# Patient Record
Sex: FEMALE | Race: OTHER | HISPANIC OR LATINO | URBAN - METROPOLITAN AREA
[De-identification: names, ages, dates, MRNs, and addresses within clinical notes are randomized per-mention and may not be internally consistent; named-entity substitution may affect disease eponyms.]

---

## 2017-02-21 ENCOUNTER — OUTPATIENT (OUTPATIENT)
Dept: OUTPATIENT SERVICES | Facility: HOSPITAL | Age: 52
LOS: 1 days | End: 2017-02-21
Payer: SELF-PAY

## 2017-02-21 ENCOUNTER — FORM ENCOUNTER (OUTPATIENT)
Age: 52
End: 2017-02-21

## 2017-02-21 ENCOUNTER — APPOINTMENT (OUTPATIENT)
Dept: INTERNAL MEDICINE | Facility: CLINIC | Age: 52
End: 2017-02-21

## 2017-02-21 VITALS
SYSTOLIC BLOOD PRESSURE: 112 MMHG | WEIGHT: 236 LBS | BODY MASS INDEX: 47.67 KG/M2 | HEART RATE: 68 BPM | DIASTOLIC BLOOD PRESSURE: 78 MMHG

## 2017-02-21 DIAGNOSIS — E66.01 MORBID (SEVERE) OBESITY DUE TO EXCESS CALORIES: ICD-10-CM

## 2017-02-21 DIAGNOSIS — R73.09 OTHER ABNORMAL GLUCOSE: ICD-10-CM

## 2017-02-21 DIAGNOSIS — Z23 ENCOUNTER FOR IMMUNIZATION: ICD-10-CM

## 2017-02-21 DIAGNOSIS — R10.9 UNSPECIFIED ABDOMINAL PAIN: ICD-10-CM

## 2017-02-21 DIAGNOSIS — M79.673 PAIN IN UNSPECIFIED FOOT: ICD-10-CM

## 2017-02-21 DIAGNOSIS — I10 ESSENTIAL (PRIMARY) HYPERTENSION: ICD-10-CM

## 2017-02-21 DIAGNOSIS — G89.29 OTHER CHRONIC PAIN: ICD-10-CM

## 2017-02-21 PROCEDURE — G0463: CPT

## 2017-02-22 ENCOUNTER — LABORATORY RESULT (OUTPATIENT)
Age: 52
End: 2017-02-22

## 2017-02-22 ENCOUNTER — OUTPATIENT (OUTPATIENT)
Dept: OUTPATIENT SERVICES | Facility: HOSPITAL | Age: 52
LOS: 1 days | End: 2017-02-22
Payer: SELF-PAY

## 2017-02-22 ENCOUNTER — APPOINTMENT (OUTPATIENT)
Dept: MAMMOGRAPHY | Facility: IMAGING CENTER | Age: 52
End: 2017-02-22

## 2017-02-22 DIAGNOSIS — R10.9 UNSPECIFIED ABDOMINAL PAIN: ICD-10-CM

## 2017-02-22 DIAGNOSIS — Z00.00 ENCOUNTER FOR GENERAL ADULT MEDICAL EXAMINATION WITHOUT ABNORMAL FINDINGS: ICD-10-CM

## 2017-02-22 DIAGNOSIS — K30 FUNCTIONAL DYSPEPSIA: ICD-10-CM

## 2017-02-22 DIAGNOSIS — K21.9 GASTRO-ESOPHAGEAL REFLUX DISEASE WITHOUT ESOPHAGITIS: ICD-10-CM

## 2017-02-22 DIAGNOSIS — A04.8 OTHER SPECIFIED BACTERIAL INTESTINAL INFECTIONS: ICD-10-CM

## 2017-02-22 DIAGNOSIS — G89.29 OTHER CHRONIC PAIN: ICD-10-CM

## 2017-02-22 DIAGNOSIS — E66.01 MORBID (SEVERE) OBESITY DUE TO EXCESS CALORIES: ICD-10-CM

## 2017-02-22 DIAGNOSIS — M79.673 PAIN IN UNSPECIFIED FOOT: ICD-10-CM

## 2017-02-22 DIAGNOSIS — R73.09 OTHER ABNORMAL GLUCOSE: ICD-10-CM

## 2017-02-22 DIAGNOSIS — Z23 ENCOUNTER FOR IMMUNIZATION: ICD-10-CM

## 2017-02-22 DIAGNOSIS — Z00.8 ENCOUNTER FOR OTHER GENERAL EXAMINATION: ICD-10-CM

## 2017-02-22 PROCEDURE — 83036 HEMOGLOBIN GLYCOSYLATED A1C: CPT

## 2017-02-22 PROCEDURE — 77063 BREAST TOMOSYNTHESIS BI: CPT

## 2017-02-22 PROCEDURE — 77067 SCR MAMMO BI INCL CAD: CPT

## 2017-02-22 PROCEDURE — 80061 LIPID PANEL: CPT

## 2017-02-23 LAB
CHOLEST SERPL-MCNC: 194 MG/DL — SIGNIFICANT CHANGE UP (ref 10–199)
HBA1C BLD-MCNC: 6.5 % — HIGH (ref 4–5.6)
HDLC SERPL-MCNC: 56 MG/DL — SIGNIFICANT CHANGE UP (ref 40–125)
LIPID PNL WITH DIRECT LDL SERPL: 124 MG/DL — SIGNIFICANT CHANGE UP
TOTAL CHOLESTEROL/HDL RATIO MEASUREMENT: 3.5 RATIO — SIGNIFICANT CHANGE UP (ref 3.3–7.1)
TRIGL SERPL-MCNC: 69 MG/DL — SIGNIFICANT CHANGE UP (ref 10–149)

## 2017-02-28 ENCOUNTER — APPOINTMENT (OUTPATIENT)
Dept: ULTRASOUND IMAGING | Facility: HOSPITAL | Age: 52
End: 2017-02-28

## 2017-06-22 ENCOUNTER — APPOINTMENT (OUTPATIENT)
Dept: INTERNAL MEDICINE | Facility: CLINIC | Age: 52
End: 2017-06-22

## 2017-06-22 ENCOUNTER — RESULT CHARGE (OUTPATIENT)
Age: 52
End: 2017-06-22

## 2017-06-22 ENCOUNTER — OUTPATIENT (OUTPATIENT)
Dept: OUTPATIENT SERVICES | Facility: HOSPITAL | Age: 52
LOS: 1 days | End: 2017-06-22
Payer: SELF-PAY

## 2017-06-22 VITALS
SYSTOLIC BLOOD PRESSURE: 118 MMHG | HEART RATE: 68 BPM | BODY MASS INDEX: 47.87 KG/M2 | WEIGHT: 237 LBS | RESPIRATION RATE: 14 BRPM | DIASTOLIC BLOOD PRESSURE: 72 MMHG

## 2017-06-22 DIAGNOSIS — I10 ESSENTIAL (PRIMARY) HYPERTENSION: ICD-10-CM

## 2017-06-22 DIAGNOSIS — Z00.8 ENCOUNTER FOR OTHER GENERAL EXAMINATION: ICD-10-CM

## 2017-06-22 PROCEDURE — G0463: CPT

## 2017-06-22 PROCEDURE — 83036 HEMOGLOBIN GLYCOSYLATED A1C: CPT

## 2017-06-26 DIAGNOSIS — M25.549 PAIN IN JOINTS OF UNSPECIFIED HAND: ICD-10-CM

## 2017-06-26 DIAGNOSIS — A04.8 OTHER SPECIFIED BACTERIAL INTESTINAL INFECTIONS: ICD-10-CM

## 2017-06-26 DIAGNOSIS — R73.09 OTHER ABNORMAL GLUCOSE: ICD-10-CM

## 2017-06-26 DIAGNOSIS — E66.01 MORBID (SEVERE) OBESITY DUE TO EXCESS CALORIES: ICD-10-CM

## 2017-06-26 LAB — HBA1C MFR BLD HPLC: 6.4

## 2017-07-12 ENCOUNTER — OUTPATIENT (OUTPATIENT)
Dept: OUTPATIENT SERVICES | Facility: HOSPITAL | Age: 52
LOS: 1 days | End: 2017-07-12
Payer: SELF-PAY

## 2017-07-12 ENCOUNTER — NON-APPOINTMENT (OUTPATIENT)
Age: 52
End: 2017-07-12

## 2017-07-12 ENCOUNTER — APPOINTMENT (OUTPATIENT)
Dept: CARDIOLOGY | Facility: HOSPITAL | Age: 52
End: 2017-07-12

## 2017-07-12 VITALS — DIASTOLIC BLOOD PRESSURE: 87 MMHG | HEART RATE: 59 BPM | OXYGEN SATURATION: 98 % | SYSTOLIC BLOOD PRESSURE: 148 MMHG

## 2017-07-12 DIAGNOSIS — I25.10 ATHEROSCLEROTIC HEART DISEASE OF NATIVE CORONARY ARTERY WITHOUT ANGINA PECTORIS: ICD-10-CM

## 2017-07-12 PROCEDURE — G0463: CPT

## 2017-07-12 PROCEDURE — 93005 ELECTROCARDIOGRAM TRACING: CPT

## 2017-07-19 ENCOUNTER — APPOINTMENT (OUTPATIENT)
Dept: PODIATRY | Facility: HOSPITAL | Age: 52
End: 2017-07-19

## 2017-07-19 ENCOUNTER — OUTPATIENT (OUTPATIENT)
Dept: OUTPATIENT SERVICES | Facility: HOSPITAL | Age: 52
LOS: 1 days | End: 2017-07-19
Payer: SELF-PAY

## 2017-07-19 VITALS
HEIGHT: 59 IN | BODY MASS INDEX: 47.37 KG/M2 | RESPIRATION RATE: 14 BRPM | SYSTOLIC BLOOD PRESSURE: 113 MMHG | HEART RATE: 69 BPM | DIASTOLIC BLOOD PRESSURE: 78 MMHG | WEIGHT: 235 LBS

## 2017-07-19 DIAGNOSIS — M25.572 PAIN IN LEFT ANKLE AND JOINTS OF LEFT FOOT: ICD-10-CM

## 2017-07-19 DIAGNOSIS — Z00.00 ENCOUNTER FOR GENERAL ADULT MEDICAL EXAMINATION WITHOUT ABNORMAL FINDINGS: ICD-10-CM

## 2017-07-19 DIAGNOSIS — M79.673 PAIN IN UNSPECIFIED FOOT: ICD-10-CM

## 2017-07-19 DIAGNOSIS — M79.609 PAIN IN UNSPECIFIED LIMB: ICD-10-CM

## 2017-07-19 PROCEDURE — 73610 X-RAY EXAM OF ANKLE: CPT

## 2017-07-19 PROCEDURE — G0463: CPT

## 2017-07-19 PROCEDURE — 73610 X-RAY EXAM OF ANKLE: CPT | Mod: 26,50

## 2017-07-19 PROCEDURE — 73630 X-RAY EXAM OF FOOT: CPT

## 2017-07-19 PROCEDURE — 73630 X-RAY EXAM OF FOOT: CPT | Mod: 26,50

## 2017-07-20 DIAGNOSIS — I31.3 PERICARDIAL EFFUSION (NONINFLAMMATORY): ICD-10-CM

## 2017-07-24 ENCOUNTER — APPOINTMENT (OUTPATIENT)
Dept: INTERNAL MEDICINE | Facility: CLINIC | Age: 52
End: 2017-07-24

## 2017-07-24 ENCOUNTER — OUTPATIENT (OUTPATIENT)
Dept: OUTPATIENT SERVICES | Facility: HOSPITAL | Age: 52
LOS: 1 days | End: 2017-07-24
Payer: SELF-PAY

## 2017-07-24 ENCOUNTER — RESULT CHARGE (OUTPATIENT)
Age: 52
End: 2017-07-24

## 2017-07-24 VITALS
WEIGHT: 235 LBS | OXYGEN SATURATION: 95 % | HEART RATE: 98 BPM | TEMPERATURE: 99.2 F | HEIGHT: 59 IN | SYSTOLIC BLOOD PRESSURE: 120 MMHG | BODY MASS INDEX: 47.37 KG/M2 | DIASTOLIC BLOOD PRESSURE: 68 MMHG | RESPIRATION RATE: 14 BRPM

## 2017-07-24 DIAGNOSIS — E66.01 MORBID (SEVERE) OBESITY DUE TO EXCESS CALORIES: ICD-10-CM

## 2017-07-24 DIAGNOSIS — M25.549 PAIN IN JOINTS OF UNSPECIFIED HAND: ICD-10-CM

## 2017-07-24 DIAGNOSIS — I10 ESSENTIAL (PRIMARY) HYPERTENSION: ICD-10-CM

## 2017-07-24 DIAGNOSIS — A04.8 OTHER SPECIFIED BACTERIAL INTESTINAL INFECTIONS: ICD-10-CM

## 2017-07-24 LAB — RESULT: POSITIVE

## 2017-07-24 PROCEDURE — G0463: CPT

## 2017-07-24 RX ORDER — PENICILLIN G BENZATHINE 1200000 [IU]/2ML
1200000 INJECTION, SUSPENSION INTRAMUSCULAR
Qty: 1 | Refills: 0 | Status: COMPLETED | OUTPATIENT
Start: 2017-07-24

## 2017-07-24 RX ADMIN — Medication 0 UNIT/2ML: at 00:00

## 2017-07-25 ENCOUNTER — FORM ENCOUNTER (OUTPATIENT)
Age: 52
End: 2017-07-25

## 2017-07-25 DIAGNOSIS — J02.9 ACUTE PHARYNGITIS, UNSPECIFIED: ICD-10-CM

## 2017-07-26 ENCOUNTER — OUTPATIENT (OUTPATIENT)
Dept: OUTPATIENT SERVICES | Facility: HOSPITAL | Age: 52
LOS: 1 days | End: 2017-07-26
Payer: SELF-PAY

## 2017-07-26 ENCOUNTER — APPOINTMENT (OUTPATIENT)
Dept: ULTRASOUND IMAGING | Facility: HOSPITAL | Age: 52
End: 2017-07-26

## 2017-07-26 DIAGNOSIS — R73.09 OTHER ABNORMAL GLUCOSE: ICD-10-CM

## 2017-07-26 DIAGNOSIS — E66.01 MORBID (SEVERE) OBESITY DUE TO EXCESS CALORIES: ICD-10-CM

## 2017-07-26 DIAGNOSIS — A04.8 OTHER SPECIFIED BACTERIAL INTESTINAL INFECTIONS: ICD-10-CM

## 2017-07-26 DIAGNOSIS — M25.549 PAIN IN JOINTS OF UNSPECIFIED HAND: ICD-10-CM

## 2017-07-26 PROCEDURE — 93923 UPR/LXTR ART STDY 3+ LVLS: CPT

## 2017-07-26 PROCEDURE — 93923 UPR/LXTR ART STDY 3+ LVLS: CPT | Mod: 26

## 2017-07-31 ENCOUNTER — APPOINTMENT (OUTPATIENT)
Dept: CV DIAGNOSITCS | Facility: HOSPITAL | Age: 52
End: 2017-07-31

## 2017-07-31 ENCOUNTER — OUTPATIENT (OUTPATIENT)
Dept: OUTPATIENT SERVICES | Facility: HOSPITAL | Age: 52
LOS: 1 days | End: 2017-07-31
Payer: SELF-PAY

## 2017-07-31 DIAGNOSIS — I25.10 ATHEROSCLEROTIC HEART DISEASE OF NATIVE CORONARY ARTERY WITHOUT ANGINA PECTORIS: ICD-10-CM

## 2017-07-31 DIAGNOSIS — I31.3 PERICARDIAL EFFUSION (NONINFLAMMATORY): ICD-10-CM

## 2017-07-31 PROCEDURE — 93306 TTE W/DOPPLER COMPLETE: CPT | Mod: 26

## 2017-07-31 PROCEDURE — 93306 TTE W/DOPPLER COMPLETE: CPT

## 2017-09-06 ENCOUNTER — APPOINTMENT (OUTPATIENT)
Dept: PODIATRY | Facility: HOSPITAL | Age: 52
End: 2017-09-06

## 2018-02-26 ENCOUNTER — APPOINTMENT (OUTPATIENT)
Dept: INTERNAL MEDICINE | Facility: CLINIC | Age: 53
End: 2018-02-26

## 2018-03-09 ENCOUNTER — LABORATORY RESULT (OUTPATIENT)
Age: 53
End: 2018-03-09

## 2018-03-09 ENCOUNTER — APPOINTMENT (OUTPATIENT)
Dept: INTERNAL MEDICINE | Facility: CLINIC | Age: 53
End: 2018-03-09

## 2018-03-09 ENCOUNTER — OUTPATIENT (OUTPATIENT)
Dept: OUTPATIENT SERVICES | Facility: HOSPITAL | Age: 53
LOS: 1 days | End: 2018-03-09
Payer: SELF-PAY

## 2018-03-09 VITALS
HEART RATE: 67 BPM | OXYGEN SATURATION: 96 % | WEIGHT: 236 LBS | BODY MASS INDEX: 44.56 KG/M2 | HEIGHT: 61 IN | DIASTOLIC BLOOD PRESSURE: 62 MMHG | SYSTOLIC BLOOD PRESSURE: 112 MMHG

## 2018-03-09 DIAGNOSIS — Z86.69 PERSONAL HISTORY OF OTHER DISEASES OF THE NERVOUS SYSTEM AND SENSE ORGANS: ICD-10-CM

## 2018-03-09 DIAGNOSIS — I10 ESSENTIAL (PRIMARY) HYPERTENSION: ICD-10-CM

## 2018-03-09 DIAGNOSIS — M25.549 PAIN IN JOINTS OF UNSPECIFIED HAND: ICD-10-CM

## 2018-03-09 DIAGNOSIS — R10.9 UNSPECIFIED ABDOMINAL PAIN: ICD-10-CM

## 2018-03-09 DIAGNOSIS — R10.2 PELVIC AND PERINEAL PAIN: ICD-10-CM

## 2018-03-09 LAB
CHOLEST SERPL-MCNC: 179 MG/DL — SIGNIFICANT CHANGE UP (ref 10–199)
CREAT ?TM UR-MCNC: 134 MG/DL — SIGNIFICANT CHANGE UP
HBA1C BLD-MCNC: 6.4 % — HIGH (ref 4–5.6)
HDLC SERPL-MCNC: 55 MG/DL — SIGNIFICANT CHANGE UP (ref 40–125)
LIPID PNL WITH DIRECT LDL SERPL: 110 MG/DL — SIGNIFICANT CHANGE UP
MICROALBUMIN UR-MCNC: 6 MG/DL — SIGNIFICANT CHANGE UP
MICROALBUMIN/CREAT UR-RTO: 45 MG/G — HIGH (ref 0–30)
TOTAL CHOLESTEROL/HDL RATIO MEASUREMENT: 3.3 RATIO — SIGNIFICANT CHANGE UP (ref 3.3–7.1)
TRIGL SERPL-MCNC: 69 MG/DL — SIGNIFICANT CHANGE UP (ref 10–149)

## 2018-03-09 PROCEDURE — G0463: CPT

## 2018-03-09 PROCEDURE — G0008: CPT

## 2018-03-09 PROCEDURE — 83036 HEMOGLOBIN GLYCOSYLATED A1C: CPT

## 2018-03-09 PROCEDURE — 82043 UR ALBUMIN QUANTITATIVE: CPT

## 2018-03-09 PROCEDURE — 90688 IIV4 VACCINE SPLT 0.5 ML IM: CPT

## 2018-03-09 PROCEDURE — 80061 LIPID PANEL: CPT

## 2018-03-22 DIAGNOSIS — M25.549 PAIN IN JOINTS OF UNSPECIFIED HAND: ICD-10-CM

## 2018-03-22 DIAGNOSIS — R10.9 UNSPECIFIED ABDOMINAL PAIN: ICD-10-CM

## 2018-03-22 DIAGNOSIS — Z87.09 PERSONAL HISTORY OF OTHER DISEASES OF THE RESPIRATORY SYSTEM: ICD-10-CM

## 2018-03-22 DIAGNOSIS — Z86.19 PERSONAL HISTORY OF OTHER INFECTIOUS AND PARASITIC DISEASES: ICD-10-CM

## 2018-03-22 DIAGNOSIS — R10.2 PELVIC AND PERINEAL PAIN: ICD-10-CM

## 2018-03-22 DIAGNOSIS — Z23 ENCOUNTER FOR IMMUNIZATION: ICD-10-CM

## 2018-03-22 DIAGNOSIS — Z86.69 PERSONAL HISTORY OF OTHER DISEASES OF THE NERVOUS SYSTEM AND SENSE ORGANS: ICD-10-CM

## 2018-07-19 ENCOUNTER — FORM ENCOUNTER (OUTPATIENT)
Age: 53
End: 2018-07-19

## 2018-07-20 ENCOUNTER — OUTPATIENT (OUTPATIENT)
Dept: OUTPATIENT SERVICES | Facility: HOSPITAL | Age: 53
LOS: 1 days | End: 2018-07-20
Payer: SELF-PAY

## 2018-07-20 ENCOUNTER — APPOINTMENT (OUTPATIENT)
Dept: MAMMOGRAPHY | Facility: IMAGING CENTER | Age: 53
End: 2018-07-20
Payer: COMMERCIAL

## 2018-07-20 DIAGNOSIS — M25.549 PAIN IN JOINTS OF UNSPECIFIED HAND: ICD-10-CM

## 2018-07-20 DIAGNOSIS — Z86.69 PERSONAL HISTORY OF OTHER DISEASES OF THE NERVOUS SYSTEM AND SENSE ORGANS: ICD-10-CM

## 2018-07-20 DIAGNOSIS — Z86.19 PERSONAL HISTORY OF OTHER INFECTIOUS AND PARASITIC DISEASES: ICD-10-CM

## 2018-07-20 DIAGNOSIS — Z23 ENCOUNTER FOR IMMUNIZATION: ICD-10-CM

## 2018-07-20 DIAGNOSIS — Z87.09 PERSONAL HISTORY OF OTHER DISEASES OF THE RESPIRATORY SYSTEM: ICD-10-CM

## 2018-07-20 DIAGNOSIS — R10.9 UNSPECIFIED ABDOMINAL PAIN: ICD-10-CM

## 2018-07-20 DIAGNOSIS — R10.2 PELVIC AND PERINEAL PAIN: ICD-10-CM

## 2018-07-20 PROCEDURE — 77067 SCR MAMMO BI INCL CAD: CPT

## 2018-07-20 PROCEDURE — 77063 BREAST TOMOSYNTHESIS BI: CPT

## 2018-07-20 PROCEDURE — 77063 BREAST TOMOSYNTHESIS BI: CPT | Mod: 26

## 2018-07-20 PROCEDURE — 77067 SCR MAMMO BI INCL CAD: CPT | Mod: 26

## 2018-08-22 ENCOUNTER — APPOINTMENT (OUTPATIENT)
Dept: PODIATRY | Facility: HOSPITAL | Age: 53
End: 2018-08-22

## 2018-08-22 ENCOUNTER — OUTPATIENT (OUTPATIENT)
Dept: OUTPATIENT SERVICES | Facility: HOSPITAL | Age: 53
LOS: 1 days | End: 2018-08-22
Payer: SELF-PAY

## 2018-08-22 ENCOUNTER — APPOINTMENT (OUTPATIENT)
Dept: CARDIOLOGY | Facility: HOSPITAL | Age: 53
End: 2018-08-22

## 2018-08-22 ENCOUNTER — NON-APPOINTMENT (OUTPATIENT)
Age: 53
End: 2018-08-22

## 2018-08-22 VITALS
WEIGHT: 236 LBS | HEIGHT: 61 IN | HEART RATE: 58 BPM | SYSTOLIC BLOOD PRESSURE: 120 MMHG | BODY MASS INDEX: 44.56 KG/M2 | RESPIRATION RATE: 14 BRPM | DIASTOLIC BLOOD PRESSURE: 77 MMHG

## 2018-08-22 VITALS
WEIGHT: 236 LBS | HEART RATE: 61 BPM | HEIGHT: 61 IN | SYSTOLIC BLOOD PRESSURE: 135 MMHG | DIASTOLIC BLOOD PRESSURE: 85 MMHG | OXYGEN SATURATION: 96 % | BODY MASS INDEX: 44.56 KG/M2

## 2018-08-22 DIAGNOSIS — I25.10 ATHEROSCLEROTIC HEART DISEASE OF NATIVE CORONARY ARTERY WITHOUT ANGINA PECTORIS: ICD-10-CM

## 2018-08-22 DIAGNOSIS — M79.673 PAIN IN UNSPECIFIED FOOT: ICD-10-CM

## 2018-08-22 DIAGNOSIS — M79.609 PAIN IN UNSPECIFIED LIMB: ICD-10-CM

## 2018-08-22 PROCEDURE — 93005 ELECTROCARDIOGRAM TRACING: CPT

## 2018-08-22 PROCEDURE — G0463: CPT

## 2018-08-22 PROCEDURE — 73630 X-RAY EXAM OF FOOT: CPT

## 2018-08-22 PROCEDURE — 73630 X-RAY EXAM OF FOOT: CPT | Mod: 26,50

## 2018-08-23 DIAGNOSIS — I31.3 PERICARDIAL EFFUSION (NONINFLAMMATORY): ICD-10-CM

## 2018-09-12 ENCOUNTER — OUTPATIENT (OUTPATIENT)
Dept: OUTPATIENT SERVICES | Facility: HOSPITAL | Age: 53
LOS: 1 days | End: 2018-09-12
Payer: SELF-PAY

## 2018-09-12 ENCOUNTER — APPOINTMENT (OUTPATIENT)
Dept: OBGYN | Facility: CLINIC | Age: 53
End: 2018-09-12

## 2018-09-12 ENCOUNTER — APPOINTMENT (OUTPATIENT)
Dept: PODIATRY | Facility: HOSPITAL | Age: 53
End: 2018-09-12

## 2018-09-12 VITALS
BODY MASS INDEX: 44.56 KG/M2 | WEIGHT: 236 LBS | HEIGHT: 61 IN | HEART RATE: 58 BPM | SYSTOLIC BLOOD PRESSURE: 113 MMHG | DIASTOLIC BLOOD PRESSURE: 76 MMHG

## 2018-09-12 DIAGNOSIS — M79.643 PAIN IN UNSPECIFIED HAND: ICD-10-CM

## 2018-09-12 PROCEDURE — 20550 NJX 1 TENDON SHEATH/LIGAMENT: CPT

## 2018-09-13 DIAGNOSIS — E11.9 TYPE 2 DIABETES MELLITUS WITHOUT COMPLICATIONS: ICD-10-CM

## 2018-09-13 DIAGNOSIS — M72.2 PLANTAR FASCIAL FIBROMATOSIS: ICD-10-CM

## 2018-09-26 ENCOUNTER — APPOINTMENT (OUTPATIENT)
Dept: PODIATRY | Facility: HOSPITAL | Age: 53
End: 2018-09-26

## 2018-09-26 ENCOUNTER — OUTPATIENT (OUTPATIENT)
Dept: OUTPATIENT SERVICES | Facility: HOSPITAL | Age: 53
LOS: 1 days | End: 2018-09-26
Payer: SELF-PAY

## 2018-09-26 VITALS
SYSTOLIC BLOOD PRESSURE: 121 MMHG | BODY MASS INDEX: 44.56 KG/M2 | WEIGHT: 236 LBS | HEART RATE: 60 BPM | HEIGHT: 61 IN | DIASTOLIC BLOOD PRESSURE: 79 MMHG | RESPIRATION RATE: 16 BRPM

## 2018-09-26 DIAGNOSIS — M72.2 PLANTAR FASCIAL FIBROMATOSIS: ICD-10-CM

## 2018-09-26 DIAGNOSIS — M79.609 PAIN IN UNSPECIFIED LIMB: ICD-10-CM

## 2018-09-26 DIAGNOSIS — M79.673 PAIN IN UNSPECIFIED FOOT: ICD-10-CM

## 2018-09-26 PROCEDURE — G0463: CPT

## 2018-10-04 ENCOUNTER — LABORATORY RESULT (OUTPATIENT)
Age: 53
End: 2018-10-04

## 2018-10-04 ENCOUNTER — APPOINTMENT (OUTPATIENT)
Dept: OBGYN | Facility: CLINIC | Age: 53
End: 2018-10-04
Payer: COMMERCIAL

## 2018-10-04 ENCOUNTER — OUTPATIENT (OUTPATIENT)
Dept: OUTPATIENT SERVICES | Facility: HOSPITAL | Age: 53
LOS: 1 days | End: 2018-10-04
Payer: SELF-PAY

## 2018-10-04 VITALS
BODY MASS INDEX: 44.79 KG/M2 | DIASTOLIC BLOOD PRESSURE: 80 MMHG | SYSTOLIC BLOOD PRESSURE: 132 MMHG | WEIGHT: 237.25 LBS | HEIGHT: 61 IN

## 2018-10-04 DIAGNOSIS — N76.0 ACUTE VAGINITIS: ICD-10-CM

## 2018-10-04 DIAGNOSIS — M72.2 PLANTAR FASCIAL FIBROMATOSIS: ICD-10-CM

## 2018-10-04 DIAGNOSIS — M25.572 PAIN IN LEFT ANKLE AND JOINTS OF LEFT FOOT: ICD-10-CM

## 2018-10-04 PROCEDURE — 87624 HPV HI-RISK TYP POOLED RSLT: CPT

## 2018-10-04 PROCEDURE — 99214 OFFICE O/P EST MOD 30 MIN: CPT | Mod: NC

## 2018-10-04 PROCEDURE — 81003 URINALYSIS AUTO W/O SCOPE: CPT

## 2018-10-04 PROCEDURE — G0463: CPT

## 2018-10-04 PROCEDURE — 81003 URINALYSIS AUTO W/O SCOPE: CPT | Mod: NC,QW

## 2018-10-08 ENCOUNTER — FORM ENCOUNTER (OUTPATIENT)
Age: 53
End: 2018-10-08

## 2018-10-09 ENCOUNTER — APPOINTMENT (OUTPATIENT)
Dept: ULTRASOUND IMAGING | Facility: CLINIC | Age: 53
End: 2018-10-09
Payer: COMMERCIAL

## 2018-10-09 ENCOUNTER — OUTPATIENT (OUTPATIENT)
Dept: OUTPATIENT SERVICES | Facility: HOSPITAL | Age: 53
LOS: 1 days | End: 2018-10-09
Payer: SELF-PAY

## 2018-10-09 DIAGNOSIS — N95.0 POSTMENOPAUSAL BLEEDING: ICD-10-CM

## 2018-10-09 DIAGNOSIS — N76.0 ACUTE VAGINITIS: ICD-10-CM

## 2018-10-09 PROCEDURE — 76830 TRANSVAGINAL US NON-OB: CPT | Mod: 26

## 2018-10-09 PROCEDURE — 76856 US EXAM PELVIC COMPLETE: CPT

## 2018-10-09 PROCEDURE — 76830 TRANSVAGINAL US NON-OB: CPT

## 2018-10-09 PROCEDURE — 76856 US EXAM PELVIC COMPLETE: CPT | Mod: 26

## 2018-10-10 ENCOUNTER — OUTPATIENT (OUTPATIENT)
Dept: OUTPATIENT SERVICES | Facility: HOSPITAL | Age: 53
LOS: 1 days | End: 2018-10-10
Payer: SELF-PAY

## 2018-10-10 ENCOUNTER — APPOINTMENT (OUTPATIENT)
Dept: PODIATRY | Facility: HOSPITAL | Age: 53
End: 2018-10-10

## 2018-10-10 VITALS
HEART RATE: 59 BPM | SYSTOLIC BLOOD PRESSURE: 111 MMHG | HEIGHT: 61 IN | DIASTOLIC BLOOD PRESSURE: 77 MMHG | RESPIRATION RATE: 16 BRPM

## 2018-10-10 DIAGNOSIS — M79.609 PAIN IN UNSPECIFIED LIMB: ICD-10-CM

## 2018-10-10 DIAGNOSIS — M72.2 PLANTAR FASCIAL FIBROMATOSIS: ICD-10-CM

## 2018-10-10 PROCEDURE — 20550 NJX 1 TENDON SHEATH/LIGAMENT: CPT

## 2018-10-12 DIAGNOSIS — M72.2 PLANTAR FASCIAL FIBROMATOSIS: ICD-10-CM

## 2018-10-12 DIAGNOSIS — M79.673 PAIN IN UNSPECIFIED FOOT: ICD-10-CM

## 2018-10-12 DIAGNOSIS — M25.572 PAIN IN LEFT ANKLE AND JOINTS OF LEFT FOOT: ICD-10-CM

## 2018-10-14 ENCOUNTER — LABORATORY RESULT (OUTPATIENT)
Age: 53
End: 2018-10-14

## 2018-10-15 ENCOUNTER — OUTPATIENT (OUTPATIENT)
Dept: OUTPATIENT SERVICES | Facility: HOSPITAL | Age: 53
LOS: 1 days | End: 2018-10-15
Payer: SELF-PAY

## 2018-10-15 ENCOUNTER — APPOINTMENT (OUTPATIENT)
Dept: OBGYN | Facility: CLINIC | Age: 53
End: 2018-10-15
Payer: COMMERCIAL

## 2018-10-15 VITALS
WEIGHT: 234 LBS | DIASTOLIC BLOOD PRESSURE: 70 MMHG | BODY MASS INDEX: 44.18 KG/M2 | SYSTOLIC BLOOD PRESSURE: 110 MMHG | HEIGHT: 61 IN

## 2018-10-15 DIAGNOSIS — N76.0 ACUTE VAGINITIS: ICD-10-CM

## 2018-10-15 PROCEDURE — 99214 OFFICE O/P EST MOD 30 MIN: CPT | Mod: NC

## 2018-10-15 PROCEDURE — G0463: CPT

## 2018-10-15 PROCEDURE — 81025 URINE PREGNANCY TEST: CPT

## 2018-10-15 PROCEDURE — 81025 URINE PREGNANCY TEST: CPT | Mod: NC

## 2018-10-17 DIAGNOSIS — N95.0 POSTMENOPAUSAL BLEEDING: ICD-10-CM

## 2018-10-19 ENCOUNTER — APPOINTMENT (OUTPATIENT)
Dept: INTERNAL MEDICINE | Facility: CLINIC | Age: 53
End: 2018-10-19

## 2018-10-22 DIAGNOSIS — M25.572 PAIN IN LEFT ANKLE AND JOINTS OF LEFT FOOT: ICD-10-CM

## 2018-10-22 DIAGNOSIS — M72.2 PLANTAR FASCIAL FIBROMATOSIS: ICD-10-CM

## 2018-10-23 ENCOUNTER — APPOINTMENT (OUTPATIENT)
Dept: GASTROENTEROLOGY | Facility: HOSPITAL | Age: 53
End: 2018-10-23
Payer: MEDICAID

## 2018-10-23 ENCOUNTER — OUTPATIENT (OUTPATIENT)
Dept: OUTPATIENT SERVICES | Facility: HOSPITAL | Age: 53
LOS: 1 days | End: 2018-10-23
Payer: SELF-PAY

## 2018-10-23 VITALS
BODY MASS INDEX: 44.18 KG/M2 | HEART RATE: 63 BPM | DIASTOLIC BLOOD PRESSURE: 81 MMHG | WEIGHT: 234 LBS | RESPIRATION RATE: 14 BRPM | SYSTOLIC BLOOD PRESSURE: 126 MMHG | HEIGHT: 61 IN

## 2018-10-23 DIAGNOSIS — Z12.11 ENCOUNTER FOR SCREENING FOR MALIGNANT NEOPLASM OF COLON: ICD-10-CM

## 2018-10-23 DIAGNOSIS — K31.9 DISEASE OF STOMACH AND DUODENUM, UNSPECIFIED: ICD-10-CM

## 2018-10-23 PROCEDURE — 99202 OFFICE O/P NEW SF 15 MIN: CPT | Mod: GC

## 2018-10-23 PROCEDURE — G0463: CPT

## 2018-10-24 DIAGNOSIS — N95.0 POSTMENOPAUSAL BLEEDING: ICD-10-CM

## 2018-10-24 DIAGNOSIS — N95.1 MENOPAUSAL AND FEMALE CLIMACTERIC STATES: ICD-10-CM

## 2018-10-29 DIAGNOSIS — E66.9 OBESITY, UNSPECIFIED: ICD-10-CM

## 2018-10-29 DIAGNOSIS — Z12.11 ENCOUNTER FOR SCREENING FOR MALIGNANT NEOPLASM OF COLON: ICD-10-CM

## 2018-10-31 ENCOUNTER — APPOINTMENT (OUTPATIENT)
Dept: PODIATRY | Facility: HOSPITAL | Age: 53
End: 2018-10-31

## 2018-11-28 ENCOUNTER — RESULT REVIEW (OUTPATIENT)
Age: 53
End: 2018-11-28

## 2018-11-28 ENCOUNTER — OUTPATIENT (OUTPATIENT)
Dept: OUTPATIENT SERVICES | Facility: HOSPITAL | Age: 53
LOS: 1 days | End: 2018-11-28
Payer: SELF-PAY

## 2018-11-28 DIAGNOSIS — R10.13 EPIGASTRIC PAIN: ICD-10-CM

## 2018-11-28 PROCEDURE — 45380 COLONOSCOPY AND BIOPSY: CPT | Mod: PT

## 2018-11-28 PROCEDURE — 45380 COLONOSCOPY AND BIOPSY: CPT | Mod: GC

## 2018-11-29 LAB — SURGICAL PATHOLOGY STUDY: SIGNIFICANT CHANGE UP

## 2019-07-26 ENCOUNTER — EMERGENCY (EMERGENCY)
Facility: HOSPITAL | Age: 54
LOS: 1 days | Discharge: HOME | End: 2019-07-26
Attending: EMERGENCY MEDICINE
Payer: MEDICAID

## 2019-07-26 VITALS
OXYGEN SATURATION: 93 % | TEMPERATURE: 98 F | WEIGHT: 240.08 LBS | RESPIRATION RATE: 17 BRPM | SYSTOLIC BLOOD PRESSURE: 110 MMHG | HEART RATE: 88 BPM | DIASTOLIC BLOOD PRESSURE: 71 MMHG

## 2019-07-26 PROCEDURE — 99053 MED SERV 10PM-8AM 24 HR FAC: CPT

## 2019-07-26 PROCEDURE — 99284 EMERGENCY DEPT VISIT MOD MDM: CPT | Mod: 25

## 2019-07-26 PROCEDURE — 93010 ELECTROCARDIOGRAM REPORT: CPT

## 2019-07-26 NOTE — ED ADULT NURSE NOTE - NSIMPLEMENTINTERV_GEN_ALL_ED
Implemented All Fall Risk Interventions:  [place] to call system. Call bell, personal items and telephone within reach. Instruct patient to call for assistance. Room bathroom lighting operational. Non-slip footwear when patient is off stretcher. Physically safe environment: no spills, clutter or unnecessary equipment. Stretcher in lowest position, wheels locked, appropriate side rails in place. Provide visual cue, wrist band, yellow gown, etc. Monitor gait and stability. Monitor for mental status changes and reorient to person, place, and time. Review medications for side effects contributing to fall risk. Reinforce activity limits and safety measures with patient and family.

## 2019-07-26 NOTE — ED ADULT NURSE NOTE - OBJECTIVE STATEMENT
Pt presents to ED with complaint of Left thigh pain, reports 1 week of left thigh pain,  pt reports being at the supermarket and feeling a "pulling" sensation to the left inner thigh, reports feeling of "sleepiness" to the left foot, pt denies smoking, recent car or plane travel, pt states thigh feels "hard", no appreciable difference felt to left thigh as compared to Right, no new onset of shortness of breath, breathing unlabored, symmetrical, skins color normal for age and race, no fevers no chills, no nausea vomiting or diarrhea, no urine sx.

## 2019-07-27 VITALS — TEMPERATURE: 98 F

## 2019-07-27 LAB
ALBUMIN SERPL ELPH-MCNC: 3.5 G/DL — SIGNIFICANT CHANGE UP (ref 3.3–5)
ALP SERPL-CCNC: 102 U/L — SIGNIFICANT CHANGE UP (ref 40–120)
ALT FLD-CCNC: 18 U/L — SIGNIFICANT CHANGE UP (ref 10–45)
ANION GAP SERPL CALC-SCNC: 10 MMOL/L — SIGNIFICANT CHANGE UP (ref 5–17)
APTT BLD: 30.4 SEC — SIGNIFICANT CHANGE UP (ref 27.5–36.3)
AST SERPL-CCNC: 19 U/L — SIGNIFICANT CHANGE UP (ref 10–40)
BASOPHILS # BLD AUTO: 0 K/UL — SIGNIFICANT CHANGE UP (ref 0–0.2)
BASOPHILS NFR BLD AUTO: 0.3 % — SIGNIFICANT CHANGE UP (ref 0–2)
BILIRUB SERPL-MCNC: 0.3 MG/DL — SIGNIFICANT CHANGE UP (ref 0.2–1.2)
BUN SERPL-MCNC: 16 MG/DL — SIGNIFICANT CHANGE UP (ref 7–23)
CALCIUM SERPL-MCNC: 9.2 MG/DL — SIGNIFICANT CHANGE UP (ref 8.4–10.5)
CHLORIDE SERPL-SCNC: 100 MMOL/L — SIGNIFICANT CHANGE UP (ref 96–108)
CO2 SERPL-SCNC: 27 MMOL/L — SIGNIFICANT CHANGE UP (ref 22–31)
CREAT SERPL-MCNC: 0.67 MG/DL — SIGNIFICANT CHANGE UP (ref 0.5–1.3)
EOSINOPHIL # BLD AUTO: 0.6 K/UL — HIGH (ref 0–0.5)
EOSINOPHIL NFR BLD AUTO: 5.1 % — SIGNIFICANT CHANGE UP (ref 0–6)
GLUCOSE SERPL-MCNC: 112 MG/DL — HIGH (ref 70–99)
HCT VFR BLD CALC: 34.4 % — LOW (ref 34.5–45)
HGB BLD-MCNC: 11.4 G/DL — LOW (ref 11.5–15.5)
INR BLD: 0.99 RATIO — SIGNIFICANT CHANGE UP (ref 0.88–1.16)
LYMPHOCYTES # BLD AUTO: 29.5 % — SIGNIFICANT CHANGE UP (ref 13–44)
LYMPHOCYTES # BLD AUTO: 3.2 K/UL — SIGNIFICANT CHANGE UP (ref 1–3.3)
MCHC RBC-ENTMCNC: 28.9 PG — SIGNIFICANT CHANGE UP (ref 27–34)
MCHC RBC-ENTMCNC: 33.2 GM/DL — SIGNIFICANT CHANGE UP (ref 32–36)
MCV RBC AUTO: 86.9 FL — SIGNIFICANT CHANGE UP (ref 80–100)
MONOCYTES # BLD AUTO: 1 K/UL — HIGH (ref 0–0.9)
MONOCYTES NFR BLD AUTO: 8.9 % — SIGNIFICANT CHANGE UP (ref 2–14)
NEUTROPHILS # BLD AUTO: 6.2 K/UL — SIGNIFICANT CHANGE UP (ref 1.8–7.4)
NEUTROPHILS NFR BLD AUTO: 56.2 % — SIGNIFICANT CHANGE UP (ref 43–77)
PLATELET # BLD AUTO: 322 K/UL — SIGNIFICANT CHANGE UP (ref 150–400)
POTASSIUM SERPL-MCNC: 3.8 MMOL/L — SIGNIFICANT CHANGE UP (ref 3.5–5.3)
POTASSIUM SERPL-SCNC: 3.8 MMOL/L — SIGNIFICANT CHANGE UP (ref 3.5–5.3)
PROT SERPL-MCNC: 7.3 G/DL — SIGNIFICANT CHANGE UP (ref 6–8.3)
PROTHROM AB SERPL-ACNC: 11.4 SEC — SIGNIFICANT CHANGE UP (ref 10–12.9)
RBC # BLD: 3.95 M/UL — SIGNIFICANT CHANGE UP (ref 3.8–5.2)
RBC # FLD: 12.4 % — SIGNIFICANT CHANGE UP (ref 10.3–14.5)
SODIUM SERPL-SCNC: 137 MMOL/L — SIGNIFICANT CHANGE UP (ref 135–145)
WBC # BLD: 11 K/UL — HIGH (ref 3.8–10.5)
WBC # FLD AUTO: 11 K/UL — HIGH (ref 3.8–10.5)

## 2019-07-27 PROCEDURE — 73502 X-RAY EXAM HIP UNI 2-3 VIEWS: CPT

## 2019-07-27 PROCEDURE — 85730 THROMBOPLASTIN TIME PARTIAL: CPT

## 2019-07-27 PROCEDURE — 85027 COMPLETE CBC AUTOMATED: CPT

## 2019-07-27 PROCEDURE — 85610 PROTHROMBIN TIME: CPT

## 2019-07-27 PROCEDURE — 71275 CT ANGIOGRAPHY CHEST: CPT

## 2019-07-27 PROCEDURE — 93971 EXTREMITY STUDY: CPT

## 2019-07-27 PROCEDURE — 73502 X-RAY EXAM HIP UNI 2-3 VIEWS: CPT | Mod: 26,LT

## 2019-07-27 PROCEDURE — 80053 COMPREHEN METABOLIC PANEL: CPT

## 2019-07-27 PROCEDURE — 93005 ELECTROCARDIOGRAM TRACING: CPT

## 2019-07-27 PROCEDURE — 99284 EMERGENCY DEPT VISIT MOD MDM: CPT | Mod: 25

## 2019-07-27 PROCEDURE — 71275 CT ANGIOGRAPHY CHEST: CPT | Mod: 26

## 2019-07-27 PROCEDURE — 93971 EXTREMITY STUDY: CPT | Mod: 26

## 2019-07-27 RX ORDER — APIXABAN 2.5 MG/1
10 TABLET, FILM COATED ORAL ONCE
Refills: 0 | Status: COMPLETED | OUTPATIENT
Start: 2019-07-27 | End: 2019-07-27

## 2019-07-27 RX ORDER — APIXABAN 2.5 MG/1
1 TABLET, FILM COATED ORAL
Qty: 90 | Refills: 0
Start: 2019-07-27 | End: 2019-08-25

## 2019-07-27 RX ADMIN — APIXABAN 10 MILLIGRAM(S): 2.5 TABLET, FILM COATED ORAL at 02:29

## 2019-07-27 NOTE — ED PROVIDER NOTE - NS ED ROS FT
Constitutional: no fevers, chills  HEENT: no visual changes, no sore throat, no rhinorrhea  CV: no cp  Resp: no sob  GI: no abd pain, n/v, diarrhea/constipation  : no dysuria, hematuria  MSK: +LLE pain  skin: no rashes  neuro: no HA, no confusion  psych: no SI/HI  heme: no LAD

## 2019-07-27 NOTE — ED PROVIDER NOTE - ATTENDING CONTRIBUTION TO CARE
attending Stacy: 53yF obesity p/w atraumatic LLE pain that started acutely today. + swelling. Concern for PE. Given initial O2 sat also consider CTA eval for PE. Will obtain labs, LE duplex, likely CTA, reassess.

## 2019-07-27 NOTE — ED PROVIDER NOTE - PHYSICAL EXAMINATION
Vitals: WNL  Gen: laying comfortably in NAD  Head: NCAT  ENT: sclerae white, anicterus, moist mucous membranes. No exudates.   CV: RRR. Audible S1 and S2. No murmurs, rubs, gallops, S3, nor S4, 2+ radial and DP pulses   Pulm: Clear to auscultation bilaterally. No wheezes, rales, or rhonchi  Abd: soft, normoactive BS x4, NTND, no rebound, no guarding, no rashes  Musculoskeletal:  swelling of L calf and thigh with mild ttp  Skin: no lesions or scars noted  Neurologic: AAOx3  : no CVA tenderness  Psych: no SI/HI

## 2019-07-27 NOTE — ED POST DISCHARGE NOTE - ADDITIONAL DOCUMENTATION
7/27/19: very extensive conversation with patient via phone, states she can afford the medication this month and is going to try and get medicaid, advised she can do this but must ensure she sees her MD this week to figure out what will occur after this month as she will be on the medication at least for a few months. advised that if she is unable to pay for the medication at all then she must return to the ED today. spoke with pharmacist who states lovenox will also be costly. phonecall ramya tried to call patient back on number listed as well as cell number provided [phone] with no answer.  - Malia Esquivel PA-C 7/27/19: very extensive conversation with patient via phone, states she can afford the medication this month and is going to try and get medicaid, advised she can do this but must ensure she sees her MD this week to figure out what will occur after this month as she will be on the medication at least for a few months. advised that if she is unable to pay for the medication at all then she must return to the ED today. spoke with pharmacist who states lovenox will also be costly. mery bui tried to call patient back on number listed as well as cell number provided [phone] with no answer.  - Malia Esquivel PA-C 7/27/2019: patient calls back at 4:35 and states she will  one months worth of the med and is seeing her MD monday and is planning to call to get INS set up on monday as well - Malia Esquivel PA-C

## 2019-07-27 NOTE — ED PROVIDER NOTE - OBJECTIVE STATEMENT
52yo F h/o obesity p/w LLE pain that started acutely today. + swelling. No recent travel, surgeries, smoking, trauma, f/c.

## 2019-07-27 NOTE — ED PROVIDER NOTE - PROGRESS NOTE DETAILS
Ricardo JAMES: pt with acute DVT, CT without PE. dc home with nancy and f/u with PMD. pt aware and agrees with plan.

## 2019-07-27 NOTE — ED POST DISCHARGE NOTE - OTHER COMMUNICATION
pharmacy calls to state ins doesn't cover any noac, spoke with social work patient with emergency medicaid only. lvm on both patient and emergency contact line to discuss if they are able to purchase it or if patient needs to return to ed for either bridge therapy or lovenox instructions. - Malia Esquivel PA-C

## 2019-07-27 NOTE — ED PROVIDER NOTE - NSFOLLOWUPINSTRUCTIONS_ED_ALL_ED_FT
1) You have a blood clot in your leg. Please take eliquist (blood thinner) as prescribed on the bottle. Please follow-up with your primary care doctor within the next 3 days.  Please call today or tomorrow for an appointment.  If you cannot follow-up with your doctor(s), please return to the ED for any urgent issues.  2) If you have any worsening of symptoms or any other concerns please return to the ED immediately.  3) Please continue taking your home medications as directed.  4) You may have been given a copy of your labs and/or imaging.  Please go over these with your primary care doctor.

## 2019-07-29 ENCOUNTER — APPOINTMENT (OUTPATIENT)
Dept: INTERNAL MEDICINE | Facility: CLINIC | Age: 54
End: 2019-07-29

## 2019-07-29 ENCOUNTER — OUTPATIENT (OUTPATIENT)
Dept: OUTPATIENT SERVICES | Facility: HOSPITAL | Age: 54
LOS: 1 days | End: 2019-07-29
Payer: SELF-PAY

## 2019-07-29 VITALS
WEIGHT: 245 LBS | HEIGHT: 61 IN | DIASTOLIC BLOOD PRESSURE: 70 MMHG | SYSTOLIC BLOOD PRESSURE: 122 MMHG | BODY MASS INDEX: 46.26 KG/M2

## 2019-07-29 DIAGNOSIS — I10 ESSENTIAL (PRIMARY) HYPERTENSION: ICD-10-CM

## 2019-07-29 PROCEDURE — G0463: CPT

## 2019-07-29 NOTE — PHYSICAL EXAM
[No Acute Distress] : no acute distress [Well Nourished] : well nourished [Well Developed] : well developed [Well-Appearing] : well-appearing [Normal Sclera/Conjunctiva] : normal sclera/conjunctiva [PERRL] : pupils equal round and reactive to light [Normal Outer Ear/Nose] : the outer ears and nose were normal in appearance [No JVD] : no jugular venous distention [Supple] : supple [No Respiratory Distress] : no respiratory distress  [Clear to Auscultation] : lungs were clear to auscultation bilaterally [Normal Rate] : normal rate  [Regular Rhythm] : with a regular rhythm [Normal S1, S2] : normal S1 and S2 [No Murmur] : no murmur heard [Pedal Pulses Present] : the pedal pulses are present [No Edema] : there was no peripheral edema [No Extremity Clubbing/Cyanosis] : no extremity clubbing/cyanosis [Normal] : soft, non-tender, non-distended, no masses palpated, no HSM and normal bowel sounds [No Rash] : no rash [Coordination Grossly Intact] : coordination grossly intact

## 2019-07-30 ENCOUNTER — MEDICATION RENEWAL (OUTPATIENT)
Age: 54
End: 2019-07-30

## 2019-07-30 NOTE — REVIEW OF SYSTEMS
[Negative] : Integumentary [Fever] : no fever [Chills] : no chills [Fatigue] : no fatigue [Night Sweats] : no night sweats [Palpitations] : no palpitations [Chest Pain] : no chest pain [Leg Claudication] : no leg claudication [Orthopnea] : no orthopnea [Shortness Of Breath] : no shortness of breath [Paroysmal Nocturnal Dyspnea] : no paroysmal nocturnal dyspnea [Cough] : no cough [Dyspnea on Exertion] : no dyspnea on exertion [Nausea] : no nausea [Abdominal Pain] : no abdominal pain [Diarrhea] : diarrhea [Vomiting] : no vomiting [Easy Bleeding] : no easy bleeding [Easy Bruising] : no easy bruising

## 2019-07-30 NOTE — HISTORY OF PRESENT ILLNESS
[Post-hospitalization from ___ Hospital] : Post-hospitalization from [unfilled] Hospital [Discharged on ___] : discharged on [unfilled] [FreeTextEntry2] : Miss Da Silva is a 53 F with a previous history of chronic LE pain, DM2, obesity. Who presented to [facility] ED with an acute onset leg pain. The patient first noticed left leg pain two weeks ago, which she described as cramp like, which differed from her normal chronic LE pain. The pain had been getting worse in terms of severity, and in terms of extent over the past few weeks. The patient was in the supermarket prior to arrival in the ED walking, where she haad an acute worsening of her pain to the point she could not ambulate, and she needed to call her daughter to bring her to the hospital. \par \par The patient was then taken to the ED where a doppler revealed DVT on the left external iliac to the left femoral veins. CTA was (-) for PE. At the time the patient was started on Eliquis. Of note the patient is uninsured and is concerned of the price burden of the drug. Currently the patient endorses improved pain in the LLE, and is now able to ambulate. There are no s&s of bleeding, or extension of the clot. The patient denies cp/SOB/cough/hemoptysis/additional leg pain/n/v/d/LOC/BOWMAN. The patient denies any recent long trips, periods of immobility, cancer history, previous blood clots, or family history of blood clots. \par \par Other history includes H. pylori s/p triple therapy, and pericardial effusion/tamponade s/p IR drainage (Dec 2013).

## 2019-07-30 NOTE — END OF VISIT
[] : Resident [FreeTextEntry3] : acute dvt, rf include obesity. rec eliquis per PAP. given phone number, website, and pharmacist alerted to help pt. fu 1w to see if approved. no bleeding; to ed if this occurs. rec fu thrombosis specilist. no hypercoag workup as first dvt. routine cancer screening.

## 2019-07-30 NOTE — ASSESSMENT
[FreeTextEntry1] : Miss Da Silva is a 53 y,o with a history of DM2, obesity, and chronic LE pain who was found to have a DVT now on anticoagulation \par \par ##Acute DVT\par \par -Noted from studies done in [facility] ED on 7/27. Now on eliquis (still on loading dose currently)\par -Concerns of affordibility since patient is uninsured\par -Currently has a month's supply\par -Discussed with patient the possibility of entering the Patient Assisstance Program via the drug \par -Provided the patient with the number of the  to see if she can have eliquis covered\par -Furthermore, to work with pharmacy to have them assist the patient to enter the Patient Assistance Program\par -To f/u in one week to see if the patient was able to enter the program above\par -To discuss with patient to repeat colonoscopy as previous one had inadequate preparation\par -If the patient is unable to enter program above, to consider other options for anticoagulation\par -To not pursue hypercoaguability work up for now. Delivered 3 babies without misscarraige. Likely 2/2 obesity\par -Also provided the patient with information to see anticoagulation clinic with Dr. Gil as an additional option

## 2019-08-02 DIAGNOSIS — E66.9 OBESITY, UNSPECIFIED: ICD-10-CM

## 2019-08-02 DIAGNOSIS — I82.409 ACUTE EMBOLISM AND THROMBOSIS OF UNSPECIFIED DEEP VEINS OF UNSPECIFIED LOWER EXTREMITY: ICD-10-CM

## 2019-08-12 ENCOUNTER — APPOINTMENT (OUTPATIENT)
Dept: INTERNAL MEDICINE | Facility: CLINIC | Age: 54
End: 2019-08-12

## 2019-08-12 ENCOUNTER — OUTPATIENT (OUTPATIENT)
Dept: OUTPATIENT SERVICES | Facility: HOSPITAL | Age: 54
LOS: 1 days | End: 2019-08-12
Payer: SELF-PAY

## 2019-08-12 VITALS
BODY MASS INDEX: 46.07 KG/M2 | WEIGHT: 244 LBS | DIASTOLIC BLOOD PRESSURE: 90 MMHG | HEIGHT: 61 IN | OXYGEN SATURATION: 98 % | HEART RATE: 86 BPM | SYSTOLIC BLOOD PRESSURE: 134 MMHG

## 2019-08-12 DIAGNOSIS — I10 ESSENTIAL (PRIMARY) HYPERTENSION: ICD-10-CM

## 2019-08-12 DIAGNOSIS — E66.9 OBESITY, UNSPECIFIED: ICD-10-CM

## 2019-08-12 LAB — HBA1C MFR BLD HPLC: 6.5

## 2019-08-12 PROCEDURE — G0463: CPT | Mod: 25

## 2019-08-12 PROCEDURE — 83036 HEMOGLOBIN GLYCOSYLATED A1C: CPT

## 2019-08-12 RX ORDER — POLYETHYLENE GLYCOL 3350, SODIUM SULFATE ANHYDROUS, SODIUM BICARBONATE, SODIUM CHLORIDE, POTASSIUM CHLORIDE 227.1; 21.5; 6.36; 5.53; .754 G/L; G/L; G/L; G/L; G/L
227.1 POWDER, FOR SOLUTION ORAL
Qty: 1 | Refills: 0 | Status: DISCONTINUED | COMMUNITY
Start: 2018-10-23 | End: 2019-08-12

## 2019-08-12 NOTE — END OF VISIT
[FreeTextEntry3] : counselled re diet and exercise for pre DM range hbA1c and elevated BMI [] : Resident

## 2019-08-12 NOTE — PHYSICAL EXAM
[No JVD] : no jugular venous distention [Supple] : supple [Pedal Pulses Present] : the pedal pulses are present [Normal] : affect was normal and insight and judgment were intact [de-identified] : Varicosities diffusely bilaterally in the lower extremities. Trace edema in LLE in upper extremities

## 2019-08-12 NOTE — HISTORY OF PRESENT ILLNESS
[FreeTextEntry1] : 53F history of T2DM, obesity, LE pain coming in for follow up for LLE DVT. [de-identified] :  Last week patient came in and symptoms had improved, now able to walk without difficulty. Today pain is improved even more, and patient has gone back to work. She denies CP/SOB/N/V/F/C, lightheadedness, dizziness, dysuria, hematuria, constipation, diarrhea, numbness, or tingling.\par \par Patient had been taking eliquis 2 tablets in the morning, and after one week switched to one tablet twice per day, effectively skipping the loading dose. She was advised to continue taking her Eliquis twice per day for the next six months. The patient has gotten in contact with  and received her fully supply of Eliquis.\par \par Patient advised to make an appointment for a CPE in five weeks. Patient was surprised to hear that her 11/2018 colonoscopy had poor prep and she would need to receive it a second time.

## 2019-08-12 NOTE — REVIEW OF SYSTEMS
[Fever] : no fever [Chills] : no chills [Palpitations] : no palpitations [Chest Pain] : no chest pain [Shortness Of Breath] : no shortness of breath [Abdominal Pain] : no abdominal pain [Nausea] : no nausea [Constipation] : no constipation [Vomiting] : no vomiting [Diarrhea] : diarrhea [Dysuria] : no dysuria [Hematuria] : no hematuria [Frequency] : no frequency [Dizziness] : no dizziness [de-identified] : no numbness, tingling

## 2019-08-12 NOTE — ASSESSMENT
[FreeTextEntry1] : Patient advised to come back in 5 weeks for CPE at which time full labs can be drawn and plans for her repeat colonoscopy addressed.

## 2019-08-13 DIAGNOSIS — I82.409 ACUTE EMBOLISM AND THROMBOSIS OF UNSPECIFIED DEEP VEINS OF UNSPECIFIED LOWER EXTREMITY: ICD-10-CM

## 2019-08-13 DIAGNOSIS — E11.9 TYPE 2 DIABETES MELLITUS WITHOUT COMPLICATIONS: ICD-10-CM

## 2019-09-18 ENCOUNTER — APPOINTMENT (OUTPATIENT)
Dept: INTERNAL MEDICINE | Facility: CLINIC | Age: 54
End: 2019-09-18

## 2019-09-18 ENCOUNTER — OUTPATIENT (OUTPATIENT)
Dept: OUTPATIENT SERVICES | Facility: HOSPITAL | Age: 54
LOS: 1 days | End: 2019-09-18
Payer: SELF-PAY

## 2019-09-18 VITALS
HEART RATE: 71 BPM | OXYGEN SATURATION: 96 % | SYSTOLIC BLOOD PRESSURE: 122 MMHG | WEIGHT: 245 LBS | DIASTOLIC BLOOD PRESSURE: 78 MMHG | BODY MASS INDEX: 46.29 KG/M2

## 2019-09-18 DIAGNOSIS — I10 ESSENTIAL (PRIMARY) HYPERTENSION: ICD-10-CM

## 2019-09-19 ENCOUNTER — LABORATORY RESULT (OUTPATIENT)
Age: 54
End: 2019-09-19

## 2019-09-19 LAB
ESTIMATED AVERAGE GLUCOSE: 131 MG/DL — HIGH (ref 68–114)
HBA1C BLD-MCNC: 6.2 % — HIGH (ref 4–5.6)
HCT VFR BLD CALC: 40.7 % — SIGNIFICANT CHANGE UP (ref 34.5–45)
HGB BLD-MCNC: 12.3 G/DL — SIGNIFICANT CHANGE UP (ref 11.5–15.5)
MCHC RBC-ENTMCNC: 27.5 PG — SIGNIFICANT CHANGE UP (ref 27–34)
MCHC RBC-ENTMCNC: 30.2 GM/DL — LOW (ref 32–36)
MCV RBC AUTO: 90.8 FL — SIGNIFICANT CHANGE UP (ref 80–100)
PLATELET # BLD AUTO: 314 K/UL — SIGNIFICANT CHANGE UP (ref 150–400)
RBC # BLD: 4.48 M/UL — SIGNIFICANT CHANGE UP (ref 3.8–5.2)
RBC # FLD: 14.1 % — SIGNIFICANT CHANGE UP (ref 10.3–14.5)
T4 FREE+ TSH PNL SERPL: 1.95 UIU/ML — SIGNIFICANT CHANGE UP (ref 0.27–4.2)
WBC # BLD: 7.49 K/UL — SIGNIFICANT CHANGE UP (ref 3.8–10.5)
WBC # FLD AUTO: 7.49 K/UL — SIGNIFICANT CHANGE UP (ref 3.8–10.5)

## 2019-09-19 PROCEDURE — 80061 LIPID PANEL: CPT

## 2019-09-19 PROCEDURE — G0008: CPT

## 2019-09-19 PROCEDURE — 80053 COMPREHEN METABOLIC PANEL: CPT

## 2019-09-19 PROCEDURE — G0463: CPT

## 2019-09-19 PROCEDURE — 83036 HEMOGLOBIN GLYCOSYLATED A1C: CPT

## 2019-09-19 PROCEDURE — 82274 ASSAY TEST FOR BLOOD FECAL: CPT

## 2019-09-19 PROCEDURE — 82306 VITAMIN D 25 HYDROXY: CPT

## 2019-09-19 PROCEDURE — 90688 IIV4 VACCINE SPLT 0.5 ML IM: CPT

## 2019-09-19 PROCEDURE — 85027 COMPLETE CBC AUTOMATED: CPT

## 2019-09-19 PROCEDURE — 84443 ASSAY THYROID STIM HORMONE: CPT

## 2019-09-20 LAB
24R-OH-CALCIDIOL SERPL-MCNC: 19.4 NG/ML — LOW (ref 30–80)
ALBUMIN SERPL ELPH-MCNC: 4.3 G/DL — SIGNIFICANT CHANGE UP (ref 3.3–5)
ALP SERPL-CCNC: 111 U/L — SIGNIFICANT CHANGE UP (ref 40–120)
ALT FLD-CCNC: 21 U/L — SIGNIFICANT CHANGE UP (ref 10–45)
ANION GAP SERPL CALC-SCNC: 11 MMOL/L — SIGNIFICANT CHANGE UP (ref 5–17)
AST SERPL-CCNC: 22 U/L — SIGNIFICANT CHANGE UP (ref 10–40)
BILIRUB SERPL-MCNC: 0.4 MG/DL — SIGNIFICANT CHANGE UP (ref 0.2–1.2)
BUN SERPL-MCNC: 13 MG/DL — SIGNIFICANT CHANGE UP (ref 7–23)
CALCIUM SERPL-MCNC: 8.9 MG/DL — SIGNIFICANT CHANGE UP (ref 8.4–10.5)
CHLORIDE SERPL-SCNC: 102 MMOL/L — SIGNIFICANT CHANGE UP (ref 96–108)
CHOLEST SERPL-MCNC: 175 MG/DL — SIGNIFICANT CHANGE UP (ref 10–199)
CO2 SERPL-SCNC: 27 MMOL/L — SIGNIFICANT CHANGE UP (ref 22–31)
CREAT SERPL-MCNC: 0.62 MG/DL — SIGNIFICANT CHANGE UP (ref 0.5–1.3)
GLUCOSE SERPL-MCNC: 101 MG/DL — HIGH (ref 70–99)
HDLC SERPL-MCNC: 51 MG/DL — SIGNIFICANT CHANGE UP
LIPID PNL WITH DIRECT LDL SERPL: 109 MG/DL — HIGH
POTASSIUM SERPL-MCNC: 4.1 MMOL/L — SIGNIFICANT CHANGE UP (ref 3.5–5.3)
POTASSIUM SERPL-SCNC: 4.1 MMOL/L — SIGNIFICANT CHANGE UP (ref 3.5–5.3)
PROT SERPL-MCNC: 7.9 G/DL — SIGNIFICANT CHANGE UP (ref 6–8.3)
SODIUM SERPL-SCNC: 140 MMOL/L — SIGNIFICANT CHANGE UP (ref 135–145)
TOTAL CHOLESTEROL/HDL RATIO MEASUREMENT: 3.4 RATIO — SIGNIFICANT CHANGE UP (ref 3.3–7.1)
TRIGL SERPL-MCNC: 74 MG/DL — SIGNIFICANT CHANGE UP (ref 10–149)

## 2019-09-26 NOTE — REVIEW OF SYSTEMS
[Fever] : no fever [Chills] : no chills [Fatigue] : no fatigue [Night Sweats] : no night sweats [Pain] : no pain [Discharge] : no discharge [Vision Problems] : no vision problems [Earache] : no earache [Hearing Loss] : no hearing loss [Nasal Discharge] : no nasal discharge [Chest Pain] : no chest pain [Palpitations] : no palpitations [Orthopnea] : no orthopnea [Shortness Of Breath] : no shortness of breath [Wheezing] : no wheezing [Dyspnea on Exertion] : no dyspnea on exertion [Cough] : no cough [Nausea] : no nausea [Abdominal Pain] : no abdominal pain [Constipation] : no constipation [Diarrhea] : diarrhea [Vomiting] : no vomiting [Heartburn] : no heartburn [Dysuria] : no dysuria [Melena] : no melena [Hematuria] : no hematuria [Incontinence] : no incontinence [Joint Pain] : no joint pain [Joint Stiffness] : no joint stiffness [Muscle Pain] : no muscle pain [Itching] : no itching [Skin Rash] : no skin rash [Headache] : no headache [Dizziness] : no dizziness [Memory Loss] : no memory loss [Suicidal] : not suicidal [Insomnia] : no insomnia [Depression] : no depression

## 2019-09-26 NOTE — ASSESSMENT
[FreeTextEntry1] : 53F history of T2DM, obesity, LLE DVT, pericardial effusion presents for CPE\par \par LLE DVT - \par - cont Eliquis, will re-eval at 6 month end(Jan) and reconsider if lifelong A/c is necessary\par - will repeat US to eval progression\par \par DMT2 - \par - advised to start exercise regimen\par - f/u A1C, lipid panel, cbc, cmp\par - optho referral given\par \par Pericardial effusion s/p pericardiocentesis\par - will refer to cards for annual eval\par \par HCM \par Colon Ca Screen - will hold on repeat colonoscopy due to eliquis. Fit test given. \par Mammo 2018 birads 1\par cervical ca - 2018 neg pap hpv\par flu shot today \par \par f/u 3 months\par d/w Dr Valdivia

## 2019-09-26 NOTE — HEALTH RISK ASSESSMENT
[No] : No [No falls in past year] : Patient reported no falls in the past year [0] : 1) Little interest or pleasure doing things: Not at all (0) [1] : 2) Feeling down, depressed, or hopeless for several days (1) [With Family] : lives with family [Single] : single [Employed] : employed [Smoke Detector] : smoke detector [Carbon Monoxide Detector] : carbon monoxide detector [Safety elements used in home] : safety elements used in home [Good] : ~his/her~  mood as  good [] : No [DLM5Alcqx] : 1 [DLP0Yxxoq] : 2 [Reports changes in hearing] : Reports no changes in hearing [Reports changes in vision] : Reports no changes in vision [Reports changes in dental health] : Reports no changes in dental health [MammogramDate] : 2018 [ColonoscopyDate] : 2018 [ColonoscopyComments] : recommended for repeat

## 2019-09-26 NOTE — HISTORY OF PRESENT ILLNESS
[de-identified] : 53F history of T2DM, obesity, LLE DVT presents for CPE\par \par LLE DVT - Endorses increased pain and swelling of LLE, also now with RLE pain for 1 week. No specific trauma. Has not taken any medications, takes tylenol when pain is severe. No discoloration or increased warmth.\par \par DMT2 - reports decreased eating in carbs, Eating fruits, oatmeal for breakfast, meats/veggies for dinner. Has limited soda intake to 1-2x/month. Walking 20min/day. Plans to start gym.  Does not take meds for DMT2.\par \par HCM \par Colon Ca Screen - will hold on repeat colonoscopy due to eliquis. Fit test given. \par Mammo 2018 birads 1\par cervical ca - 2018 neg pap hpv\par flu shot today

## 2019-09-26 NOTE — PHYSICAL EXAM
[No Acute Distress] : no acute distress [Well Nourished] : well nourished [Well Developed] : well developed [Well-Appearing] : well-appearing [Normal Sclera/Conjunctiva] : normal sclera/conjunctiva [PERRL] : pupils equal round and reactive to light [EOMI] : extraocular movements intact [Normal Outer Ear/Nose] : the outer ears and nose were normal in appearance [Normal Oropharynx] : the oropharynx was normal [Normal TMs] : both tympanic membranes were normal [Normal Nasal Mucosa] : the nasal mucosa was normal [No JVD] : no jugular venous distention [No Lymphadenopathy] : no lymphadenopathy [Supple] : supple [Thyroid Normal, No Nodules] : the thyroid was normal and there were no nodules present [No Respiratory Distress] : no respiratory distress  [No Accessory Muscle Use] : no accessory muscle use [Clear to Auscultation] : lungs were clear to auscultation bilaterally [Normal Rate] : normal rate  [Regular Rhythm] : with a regular rhythm [Normal S1, S2] : normal S1 and S2 [No Murmur] : no murmur heard [Pedal Pulses Present] : the pedal pulses are present [No Edema] : there was no peripheral edema [No Palpable Aorta] : no palpable aorta [Soft] : abdomen soft [Non Tender] : non-tender [Non-distended] : non-distended [No Masses] : no abdominal mass palpated [No HSM] : no HSM [Normal Bowel Sounds] : normal bowel sounds [Normal Supraclavicular Nodes] : no supraclavicular lymphadenopathy [Normal Posterior Cervical Nodes] : no posterior cervical lymphadenopathy [Normal Anterior Cervical Nodes] : no anterior cervical lymphadenopathy [No CVA Tenderness] : no CVA  tenderness [No Spinal Tenderness] : no spinal tenderness [No Joint Swelling] : no joint swelling [Grossly Normal Strength/Tone] : grossly normal strength/tone [No Rash] : no rash [Coordination Grossly Intact] : coordination grossly intact [No Focal Deficits] : no focal deficits [Normal Gait] : normal gait [Deep Tendon Reflexes (DTR)] : deep tendon reflexes were 2+ and symmetric [Normal Affect] : the affect was normal [Normal Insight/Judgement] : insight and judgment were intact [de-identified] : LLE swelling>RLE

## 2019-09-27 DIAGNOSIS — I82.409 ACUTE EMBOLISM AND THROMBOSIS OF UNSPECIFIED DEEP VEINS OF UNSPECIFIED LOWER EXTREMITY: ICD-10-CM

## 2019-09-27 DIAGNOSIS — Z23 ENCOUNTER FOR IMMUNIZATION: ICD-10-CM

## 2019-09-27 DIAGNOSIS — Z00.00 ENCOUNTER FOR GENERAL ADULT MEDICAL EXAMINATION WITHOUT ABNORMAL FINDINGS: ICD-10-CM

## 2019-09-27 DIAGNOSIS — I31.3 PERICARDIAL EFFUSION (NONINFLAMMATORY): ICD-10-CM

## 2019-09-27 DIAGNOSIS — E11.9 TYPE 2 DIABETES MELLITUS WITHOUT COMPLICATIONS: ICD-10-CM

## 2019-09-27 DIAGNOSIS — K63.5 POLYP OF COLON: ICD-10-CM

## 2019-09-27 LAB — OB PNL STL IA: NEGATIVE — SIGNIFICANT CHANGE UP

## 2019-10-01 ENCOUNTER — APPOINTMENT (OUTPATIENT)
Dept: OBGYN | Facility: CLINIC | Age: 54
End: 2019-10-01

## 2019-10-08 ENCOUNTER — FORM ENCOUNTER (OUTPATIENT)
Age: 54
End: 2019-10-08

## 2019-10-09 ENCOUNTER — APPOINTMENT (OUTPATIENT)
Dept: CARDIOLOGY | Facility: HOSPITAL | Age: 54
End: 2019-10-09

## 2019-10-09 ENCOUNTER — OUTPATIENT (OUTPATIENT)
Dept: OUTPATIENT SERVICES | Facility: HOSPITAL | Age: 54
LOS: 1 days | End: 2019-10-09
Payer: SELF-PAY

## 2019-10-09 VITALS
SYSTOLIC BLOOD PRESSURE: 131 MMHG | HEART RATE: 70 BPM | DIASTOLIC BLOOD PRESSURE: 83 MMHG | HEIGHT: 61 IN | OXYGEN SATURATION: 96 %

## 2019-10-09 DIAGNOSIS — I82.402 ACUTE EMBOLISM AND THROMBOSIS OF UNSPECIFIED DEEP VEINS OF LEFT LOWER EXTREMITY: ICD-10-CM

## 2019-10-09 DIAGNOSIS — I25.10 ATHEROSCLEROTIC HEART DISEASE OF NATIVE CORONARY ARTERY WITHOUT ANGINA PECTORIS: ICD-10-CM

## 2019-10-09 DIAGNOSIS — I82.409 ACUTE EMBOLISM AND THROMBOSIS OF UNSPECIFIED DEEP VEINS OF UNSPECIFIED LOWER EXTREMITY: ICD-10-CM

## 2019-10-09 PROCEDURE — 93005 ELECTROCARDIOGRAM TRACING: CPT

## 2019-10-09 PROCEDURE — 93971 EXTREMITY STUDY: CPT | Mod: 26

## 2019-10-09 PROCEDURE — G0463: CPT

## 2019-10-09 PROCEDURE — 93971 EXTREMITY STUDY: CPT

## 2019-10-10 NOTE — PHYSICAL EXAM
[Normal Appearance] : normal appearance [General Appearance - Well Developed] : well developed [Well Groomed] : well groomed [General Appearance - Well Nourished] : well nourished [General Appearance - In No Acute Distress] : no acute distress [No Deformities] : no deformities [Normal Oral Mucosa] : normal oral mucosa [Normal Conjunctiva] : the conjunctiva exhibited no abnormalities [Eyelids - No Xanthelasma] : the eyelids demonstrated no xanthelasmas [No Oral Cyanosis] : no oral cyanosis [No Oral Pallor] : no oral pallor [Normal Jugular Venous A Waves Present] : normal jugular venous A waves present [Normal Jugular Venous V Waves Present] : normal jugular venous V waves present [No Jugular Venous Real A Waves] : no jugular venous real A waves [Respiration, Rhythm And Depth] : normal respiratory rhythm and effort [Auscultation Breath Sounds / Voice Sounds] : lungs were clear to auscultation bilaterally [Exaggerated Use Of Accessory Muscles For Inspiration] : no accessory muscle use [Murmurs] : no murmurs present [Heart Sounds] : normal S1 and S2 [Heart Rate And Rhythm] : heart rate and rhythm were normal [Abdomen Soft] : soft [] : no hepato-splenomegaly [Abdomen Tenderness] : non-tender [Abdomen Mass (___ Cm)] : no abdominal mass palpated [FreeTextEntry1] : LLE w/ pitting edema to knee, no erythema, WWP, DP 2+ b/l

## 2019-10-10 NOTE — HISTORY OF PRESENT ILLNESS
[FreeTextEntry1] : 53 F PMHx of GERD and pericardial effusion/tamponade of unknown etiology s/p IR-guided drainage (Dec 2013), recent unprovoked LLE DVT (7/2019) on Eliquis, here for follow up.   Regarding cardiac hx, has NST 2/2016 wnl and TTE in 7/2017 also wnl.  Since last visit in 8/2018, other than the recent diagnosis of LLE DVT, pt has been in her USOH.  Pt reports pain in LLE w/ ongoing swelling, mildly worsening since dx of DVT.  Otherwise, denies CP, palpitations, SOB, changes in weight, decrease in exercise tolerance, orthopnea, PND.  Regarding obesity, pt states she is continuing her low-carb low fat diet, however is unable to lose weight.  Has been offered bariatric surgery in the past but does not wish to pursue at this time.  Recent LRP w/ , 10yr ASCVD risk 1.3%.

## 2019-10-10 NOTE — ASSESSMENT
[FreeTextEntry1] : 53 F PMHx of GERD and pericardial effusion/tamponade of unknown etiology s/p IR-guided drainage (Dec 2013), recent unprovoked LLE DVT (7/2019) on Eliquis, here for follow up.\par \par \par #LLE DVT\par -unprovoked per chart review, on Eliquis\par -LLE w/ edema today, WWP w/ mild TTP but likely neuropathic\par -unclear if tx failure (unclear efficacy of Eliquis in pts w/ BMI > 40)\par -pt to go for repeat LLE duplex today (script in hand)\par -PCP to follow up results\par \par #Obesity\par -Recent LRP w/ , 10yr ASCVD risk 1.3%.\par -strongly advised on lifestyle changes and bariatric surgery which she defers at this time\par \par #Hx of pericardial effusion\par -dx 2013, unclear etiology\par -most recent TTE 2017 wnl\par -EKG in office wnl\par -no need for further monitoring at this point\par \par Will discuss w/ attending\par Agus Bridges, PGY4\par

## 2019-10-11 DIAGNOSIS — R73.03 PREDIABETES: ICD-10-CM

## 2020-02-18 ENCOUNTER — LABORATORY RESULT (OUTPATIENT)
Age: 55
End: 2020-02-18

## 2020-02-18 ENCOUNTER — OUTPATIENT (OUTPATIENT)
Dept: OUTPATIENT SERVICES | Facility: HOSPITAL | Age: 55
LOS: 1 days | End: 2020-02-18
Payer: SELF-PAY

## 2020-02-18 ENCOUNTER — APPOINTMENT (OUTPATIENT)
Dept: OBGYN | Facility: CLINIC | Age: 55
End: 2020-02-18
Payer: COMMERCIAL

## 2020-02-18 VITALS
DIASTOLIC BLOOD PRESSURE: 70 MMHG | BODY MASS INDEX: 46.26 KG/M2 | WEIGHT: 245 LBS | HEIGHT: 61 IN | SYSTOLIC BLOOD PRESSURE: 110 MMHG

## 2020-02-18 DIAGNOSIS — I25.10 ATHEROSCLEROTIC HEART DISEASE OF NATIVE CORONARY ARTERY WITHOUT ANGINA PECTORIS: ICD-10-CM

## 2020-02-18 DIAGNOSIS — R73.03 PREDIABETES: ICD-10-CM

## 2020-02-18 DIAGNOSIS — N95.1 MENOPAUSAL AND FEMALE CLIMACTERIC STATES: ICD-10-CM

## 2020-02-18 DIAGNOSIS — N76.0 ACUTE VAGINITIS: ICD-10-CM

## 2020-02-18 PROCEDURE — 87624 HPV HI-RISK TYP POOLED RSLT: CPT

## 2020-02-18 PROCEDURE — G0463: CPT

## 2020-02-18 PROCEDURE — 99213 OFFICE O/P EST LOW 20 MIN: CPT | Mod: GC

## 2020-02-19 ENCOUNTER — APPOINTMENT (OUTPATIENT)
Dept: INTERNAL MEDICINE | Facility: CLINIC | Age: 55
End: 2020-02-19

## 2020-02-19 ENCOUNTER — OUTPATIENT (OUTPATIENT)
Dept: OUTPATIENT SERVICES | Facility: HOSPITAL | Age: 55
LOS: 1 days | End: 2020-02-19
Payer: SELF-PAY

## 2020-02-19 VITALS
SYSTOLIC BLOOD PRESSURE: 130 MMHG | BODY MASS INDEX: 46.26 KG/M2 | HEIGHT: 61 IN | DIASTOLIC BLOOD PRESSURE: 70 MMHG | WEIGHT: 245 LBS

## 2020-02-19 DIAGNOSIS — I10 ESSENTIAL (PRIMARY) HYPERTENSION: ICD-10-CM

## 2020-02-19 LAB — HPV HIGH+LOW RISK DNA PNL CVX: SIGNIFICANT CHANGE UP

## 2020-02-19 PROCEDURE — G0463: CPT

## 2020-02-20 ENCOUNTER — FORM ENCOUNTER (OUTPATIENT)
Age: 55
End: 2020-02-20

## 2020-02-20 NOTE — HISTORY OF PRESENT ILLNESS
[de-identified] : Patient is a 54F history of  GERD, pericardial effusion/tamponade s/p drainage (12/2013), T2DM, obesity, LLE unprovoked LLE DVT (7/2019) on eliquis who presents for a followup visit. She wanted to discuss her FIT testing results, which was negative. Currently, reports b/l knee pain that is 4-5/10, intermittent for a couple of months. radiating to her feet. Denies numbness, tingling, f/c/h/d/n/v, abdominal pain, chest pain and SOB. She has been taking tylenol, which helps with her symptoms. Patient works as a . She recently had US LE which was negative for DVT.  [FreeTextEntry1] : follow up visit

## 2020-02-20 NOTE — PHYSICAL EXAM
[No Acute Distress] : no acute distress [Well Developed] : well developed [No Respiratory Distress] : no respiratory distress  [Clear to Auscultation] : lungs were clear to auscultation bilaterally [Normal Rate] : normal rate  [Regular Rhythm] : with a regular rhythm [Normal S1, S2] : normal S1 and S2 [Soft] : abdomen soft [Non Tender] : non-tender [Grossly Normal Strength/Tone] : grossly normal strength/tone [Normal Bowel Sounds] : normal bowel sounds [Coordination Grossly Intact] : coordination grossly intact [No Focal Deficits] : no focal deficits [de-identified] : LLE w/ pitting edema to knee [de-identified] : diffusely distended 2/2 body habitus

## 2020-02-20 NOTE — ASSESSMENT
[FreeTextEntry1] : Patient is a 54F history of  GERD, pericardial effusion/tamponade s/p drainage (12/2013), T2DM, obesity, LLE unprovoked LLE DVT (7/2019) on eliquis who presents for a followup visit.- negative for DVT\par \par # LLE DVT\par - US duplex (7/2019)- DVT left external iliac to the left femoral veins.\par - unprovoked per chart review, on Eliquis\par - Repeat LLE duplex (10/2019)- previously seen DVT no longer imaged\par - Given repeat US negative for DVT, will discontinue eliquis\par \par Case d/w Dr. Ramirez\par - RTC in 5 weeks for CPE\par

## 2020-02-20 NOTE — REVIEW OF SYSTEMS
[Fever] : no fever [Chills] : no chills [Chest Pain] : no chest pain [Shortness Of Breath] : no shortness of breath [Palpitations] : no palpitations [Abdominal Pain] : no abdominal pain [Nausea] : no nausea [Cough] : no cough [Vomiting] : no vomiting [Dizziness] : no dizziness [Headache] : no headache

## 2020-02-21 ENCOUNTER — OUTPATIENT (OUTPATIENT)
Dept: OUTPATIENT SERVICES | Facility: HOSPITAL | Age: 55
LOS: 1 days | End: 2020-02-21
Payer: SELF-PAY

## 2020-02-21 ENCOUNTER — APPOINTMENT (OUTPATIENT)
Dept: MAMMOGRAPHY | Facility: IMAGING CENTER | Age: 55
End: 2020-02-21
Payer: COMMERCIAL

## 2020-02-21 DIAGNOSIS — N76.0 ACUTE VAGINITIS: ICD-10-CM

## 2020-02-21 PROCEDURE — 77063 BREAST TOMOSYNTHESIS BI: CPT

## 2020-02-21 PROCEDURE — 77067 SCR MAMMO BI INCL CAD: CPT | Mod: 26

## 2020-02-21 PROCEDURE — 77067 SCR MAMMO BI INCL CAD: CPT

## 2020-02-21 PROCEDURE — 77063 BREAST TOMOSYNTHESIS BI: CPT | Mod: 26

## 2020-02-21 NOTE — HISTORY OF PRESENT ILLNESS
[1 Year Ago] : 1 year ago [Fair] : being in fair health [Last Mammogram ___] : Last Mammogram was [unfilled] [Last Colonoscopy ___] : Last colonoscopy [unfilled] [Last Pap ___] : Last cervical pap smear was [unfilled] [Postmenopausal] : is postmenopausal [de-identified] : denies [Hot Flashes] : no hot flashes [Night Sweats] : no night sweats [Fatigue] : no fatigue [Depression] : no depression [Anxiety] : no anxiety

## 2020-02-21 NOTE — PHYSICAL EXAM
[Awake] : awake [Alert] : alert [Soft] : soft [No Lesions] : no genitalia lesions [Normal] : uterus [Labia Majora] : labia major [No Bleeding] : there was no active vaginal bleeding [Normal Position] : in a normal position [Uterine Adnexae] : were not tender and not enlarged [Acute Distress] : no acute distress [Mass] : no breast mass [Nipple Discharge] : no nipple discharge [Axillary LAD] : no axillary lymphadenopathy [Distended] : not distended [Tender] : non tender [H/Smegaly] : no hepatosplenomegaly [Labia Majora Erythema] : no erythema of the labia majora [Erythema] : no erythema [Atrophy] : no atrophy [Cystocele] : no cystocele [Rectocele] : no rectocele [Discharge] : had no discharge [Tenderness] : nontender

## 2020-02-22 LAB — CYTOLOGY SPEC DOC CYTO: SIGNIFICANT CHANGE UP

## 2020-02-24 DIAGNOSIS — I82.409 ACUTE EMBOLISM AND THROMBOSIS OF UNSPECIFIED DEEP VEINS OF UNSPECIFIED LOWER EXTREMITY: ICD-10-CM

## 2020-05-20 ENCOUNTER — APPOINTMENT (OUTPATIENT)
Dept: OBGYN | Facility: CLINIC | Age: 55
End: 2020-05-20

## 2020-07-17 ENCOUNTER — APPOINTMENT (OUTPATIENT)
Dept: INTERNAL MEDICINE | Facility: CLINIC | Age: 55
End: 2020-07-17

## 2020-07-17 ENCOUNTER — LABORATORY RESULT (OUTPATIENT)
Age: 55
End: 2020-07-17

## 2020-07-17 ENCOUNTER — OUTPATIENT (OUTPATIENT)
Dept: OUTPATIENT SERVICES | Facility: HOSPITAL | Age: 55
LOS: 1 days | End: 2020-07-17
Payer: SELF-PAY

## 2020-07-17 VITALS
WEIGHT: 250 LBS | HEIGHT: 61 IN | DIASTOLIC BLOOD PRESSURE: 82 MMHG | BODY MASS INDEX: 47.2 KG/M2 | SYSTOLIC BLOOD PRESSURE: 140 MMHG

## 2020-07-17 VITALS — OXYGEN SATURATION: 96 %

## 2020-07-17 DIAGNOSIS — I82.409 ACUTE EMBOLISM AND THROMBOSIS OF UNSPECIFIED DEEP VEINS OF UNSPECIFIED LOWER EXTREMITY: ICD-10-CM

## 2020-07-17 DIAGNOSIS — R73.03 PREDIABETES: ICD-10-CM

## 2020-07-17 DIAGNOSIS — I10 ESSENTIAL (PRIMARY) HYPERTENSION: ICD-10-CM

## 2020-07-17 DIAGNOSIS — E11.9 TYPE 2 DIABETES MELLITUS WITHOUT COMPLICATIONS: ICD-10-CM

## 2020-07-17 PROCEDURE — 80053 COMPREHEN METABOLIC PANEL: CPT

## 2020-07-17 PROCEDURE — 85027 COMPLETE CBC AUTOMATED: CPT

## 2020-07-17 PROCEDURE — G0463: CPT

## 2020-07-17 PROCEDURE — 83036 HEMOGLOBIN GLYCOSYLATED A1C: CPT

## 2020-07-19 NOTE — ASSESSMENT
[FreeTextEntry1] : The patient is a 54 year old female, PMH GERD, pericardial effusion (2013, unclear etiology), T2DM, obesity, LLE unprovoked DVT on Eliquis, presenting for medication review. \par \par #LLE DVT\par -patient has been on Eliquis for 12 months\par -Evaluated for DVT with ultrasound in February, no current evidence of recurrence. Also no evidence of PE- no SOB, O2 Sat 96%. \par -Will stop Eliquis, as no indication to continue \par -Patient will return in 1 month for full CME. Will perform coagulopathy labs next visit to further workup unprovoked DVT, as only known risk factor was obesity. \par -Patient instructed to contact us sooner if DVT occurs, and go to ED if SOB. \par -Would like patient to see Dr Roberto Neville- referred to cardiology clinic \par \par #Health Maintenance \par -BMP, CBC, AIC (T2DM history)- last labs were September 2019. \par \par #Tamponade history\par -patient concerned if needs cardiology F/U\par -No acute concerning cardiac symptoms at this time\par -Per cardiology note October 2019, latest TTE in 2017 WNL, no further workup needed \par

## 2020-07-19 NOTE — END OF VISIT
[] : Resident [FreeTextEntry3] : Patient directed to Dr. Roberto Will in cardiology clinic for evaluation. Briefly, patient was diagnosed with prox DVT in 7/19 and placed on Eliquis. F/U dopper US negative. Patient was to d/c Eliquis in 2/20, but did not. Presumptive diagnosis "unprovoked DVT," however hypercoagulability work-up not yet complete. Refer to Dr. Will for risk stratification and potential need for ongoing anti-coagulation.

## 2020-07-19 NOTE — REVIEW OF SYSTEMS
[Chest Pain] : chest pain [Dyspnea on Exertion] : dyspnea on exertion [Fever] : no fever [Chills] : no chills [Fatigue] : no fatigue [Recent Change In Weight] : ~T no recent weight change [Palpitations] : no palpitations [Lower Ext Edema] : no lower extremity edema [Shortness Of Breath] : no shortness of breath [Leg Claudication] : no leg claudication [FreeTextEntry5] : Mild intermittent chest tightness at night in bed, resolves on its own quickly

## 2020-07-19 NOTE — PHYSICAL EXAM
[No Acute Distress] : no acute distress [Well Nourished] : well nourished [Well Developed] : well developed [Well-Appearing] : well-appearing [No Respiratory Distress] : no respiratory distress  [Clear to Auscultation] : lungs were clear to auscultation bilaterally [Normal Rate] : normal rate  [No Accessory Muscle Use] : no accessory muscle use [Regular Rhythm] : with a regular rhythm [Normal S1, S2] : normal S1 and S2 [Alert and Oriented x3] : oriented to person, place, and time [No Murmur] : no murmur heard [de-identified] : No lower extremity swelling or tenderness to palpation bilaterally

## 2020-07-19 NOTE — HISTORY OF PRESENT ILLNESS
[FreeTextEntry8] : The patient is a 54 year old female, PMH GERD, pericardial tamponade (2013, unknown etiology), T2DM, obesity, LLE unprovoked DVT (July 2019) presenting to review medications. The patient was started on Eliquis 5mg BID following her DVT last July, and has been taking it since. She was last seen in clinic in February for evaluation and had no signs of DVT recurrence, so was told to stop the Eliquis. However, the patient continued to take it as she had it at home. She is currently denying any new leg swelling or tenderness, as well as SOB (has a bit of SOB when walking up stairs but states that this is a chronic issue). She is also expressing concern regarding her heart given her history of tamponade, and would like to know if she needs cardiology followup.

## 2020-07-20 ENCOUNTER — LABORATORY RESULT (OUTPATIENT)
Age: 55
End: 2020-07-20

## 2020-07-20 ENCOUNTER — APPOINTMENT (OUTPATIENT)
Dept: OBGYN | Facility: CLINIC | Age: 55
End: 2020-07-20
Payer: COMMERCIAL

## 2020-07-20 ENCOUNTER — OUTPATIENT (OUTPATIENT)
Dept: OUTPATIENT SERVICES | Facility: HOSPITAL | Age: 55
LOS: 1 days | End: 2020-07-20
Payer: SELF-PAY

## 2020-07-20 ENCOUNTER — RESULT CHARGE (OUTPATIENT)
Age: 55
End: 2020-07-20

## 2020-07-20 VITALS — DIASTOLIC BLOOD PRESSURE: 70 MMHG | BODY MASS INDEX: 48.37 KG/M2 | SYSTOLIC BLOOD PRESSURE: 118 MMHG | WEIGHT: 256 LBS

## 2020-07-20 DIAGNOSIS — N95.0 POSTMENOPAUSAL BLEEDING: ICD-10-CM

## 2020-07-20 DIAGNOSIS — N76.0 ACUTE VAGINITIS: ICD-10-CM

## 2020-07-20 DIAGNOSIS — R30.0 DYSURIA: ICD-10-CM

## 2020-07-20 DIAGNOSIS — I31.3 PERICARDIAL EFFUSION (NONINFLAMMATORY): ICD-10-CM

## 2020-07-20 LAB
HCT VFR BLD CALC: 41 % — SIGNIFICANT CHANGE UP (ref 34.5–45)
HGB BLD-MCNC: 12.9 G/DL — SIGNIFICANT CHANGE UP (ref 11.5–15.5)
MCHC RBC-ENTMCNC: 28.1 PG — SIGNIFICANT CHANGE UP (ref 27–34)
MCHC RBC-ENTMCNC: 31.5 GM/DL — LOW (ref 32–36)
MCV RBC AUTO: 89.3 FL — SIGNIFICANT CHANGE UP (ref 80–100)
PLATELET # BLD AUTO: 339 K/UL — SIGNIFICANT CHANGE UP (ref 150–400)
RBC # BLD: 4.59 M/UL — SIGNIFICANT CHANGE UP (ref 3.8–5.2)
RBC # FLD: 14 % — SIGNIFICANT CHANGE UP (ref 10.3–14.5)
T4 FREE+ TSH PNL SERPL: 1.78 UIU/ML — SIGNIFICANT CHANGE UP (ref 0.27–4.2)
WBC # BLD: 8.98 K/UL — SIGNIFICANT CHANGE UP (ref 3.8–10.5)
WBC # FLD AUTO: 8.98 K/UL — SIGNIFICANT CHANGE UP (ref 3.8–10.5)

## 2020-07-20 PROCEDURE — 81003 URINALYSIS AUTO W/O SCOPE: CPT

## 2020-07-20 PROCEDURE — 36415 COLL VENOUS BLD VENIPUNCTURE: CPT

## 2020-07-20 PROCEDURE — 85027 COMPLETE CBC AUTOMATED: CPT

## 2020-07-20 PROCEDURE — 99214 OFFICE O/P EST MOD 30 MIN: CPT | Mod: NC,25

## 2020-07-20 PROCEDURE — G0463: CPT

## 2020-07-20 PROCEDURE — 36415 COLL VENOUS BLD VENIPUNCTURE: CPT | Mod: NC

## 2020-07-20 PROCEDURE — 84443 ASSAY THYROID STIM HORMONE: CPT

## 2020-07-20 PROCEDURE — 58100 BIOPSY OF UTERUS LINING: CPT

## 2020-07-20 PROCEDURE — 58100 BIOPSY OF UTERUS LINING: CPT | Mod: NC

## 2020-07-20 NOTE — HISTORY OF PRESENT ILLNESS
[Approximately ___ (Month)] : the LMP was approximately [unfilled] month(s) ago [Normal Amount/Duration] : was of a normal amount and duration [Sexually Active] : is sexually active [Male ___] : [unfilled] male [Monogamous] : is monogamous [Spotting Between  Menses] : no spotting between menses

## 2020-07-20 NOTE — PHYSICAL EXAM
[No Lesions] : no genitalia lesions [Labia Majora] : labia major [Labia Minora] : labia minora [Normal] : clitoris [No Bleeding] : there was no active vaginal bleeding [Discharge] : had a ~M discharge [Normal Position] : in a normal position [Uterine Adnexae] : were not tender and not enlarged [No Tenderness] : no rectal tenderness [Nl Sphincter Tone] : normal sphincter tone [Pap Obtained] : a Pap smear was not performed [Motion Tenderness] : there was no cervical motion tenderness [Tenderness] : nontender [Enlarged ___ wks] : not enlarged [Mass ___ cm] : no uterine mass was palpated [Adnexa Tenderness] : were not tender [Ovarian Mass (___ Cm)] : there were no adnexal masses

## 2020-07-20 NOTE — PROCEDURE
[Endometrial Biopsy] : Endometrial biopsy [Risks] : risks [Irregular Bleeding] : irregular uterine bleeding [Benefits] : benefits [Infection] : infection [Patient] : patient [Alternatives] : alternatives [Bleeding] : bleeding [Allergic Reaction] : allergic reaction [LMP ___] : LMP was [unfilled] [Uterine Perforation] : uterine perforation [Neg Pregnancy Test] : a pregnancy test was negative [No Premedication] : No premedication [None] : none [Tenaculum] : a single toothed tenaculum [Sounded to ___ cm] : sounded to [unfilled] ~Ucm [Anteverted] : anteverted [Pipelle] : a Pipelle endometrial suction curette [Moderate] : a moderate [Sent to Histology] : the specimen was place in buffered formalin and sent for pathlogy [de-identified] : os finder

## 2020-07-21 LAB
BILIRUB UR QL STRIP: NORMAL
GLUCOSE UR-MCNC: NORMAL
HCG UR QL: 0.2 EU/DL
HGB UR QL STRIP.AUTO: NORMAL
KETONES UR-MCNC: NORMAL
LEUKOCYTE ESTERASE UR QL STRIP: NORMAL
NITRITE UR QL STRIP: NORMAL
PH UR STRIP: 5
PROT UR STRIP-MCNC: NORMAL
SP GR UR STRIP: 1.02

## 2020-07-22 ENCOUNTER — NON-APPOINTMENT (OUTPATIENT)
Age: 55
End: 2020-07-22

## 2020-07-22 ENCOUNTER — APPOINTMENT (OUTPATIENT)
Dept: CARDIOLOGY | Facility: HOSPITAL | Age: 55
End: 2020-07-22

## 2020-07-22 ENCOUNTER — OUTPATIENT (OUTPATIENT)
Dept: OUTPATIENT SERVICES | Facility: HOSPITAL | Age: 55
LOS: 1 days | End: 2020-07-22
Payer: SELF-PAY

## 2020-07-22 VITALS
OXYGEN SATURATION: 95 % | SYSTOLIC BLOOD PRESSURE: 131 MMHG | HEART RATE: 64 BPM | WEIGHT: 256 LBS | HEIGHT: 61 IN | BODY MASS INDEX: 48.33 KG/M2 | DIASTOLIC BLOOD PRESSURE: 84 MMHG

## 2020-07-22 DIAGNOSIS — I25.10 ATHEROSCLEROTIC HEART DISEASE OF NATIVE CORONARY ARTERY WITHOUT ANGINA PECTORIS: ICD-10-CM

## 2020-07-22 PROCEDURE — 93005 ELECTROCARDIOGRAM TRACING: CPT

## 2020-07-22 PROCEDURE — G0463: CPT

## 2020-07-24 DIAGNOSIS — E11.9 TYPE 2 DIABETES MELLITUS WITHOUT COMPLICATIONS: ICD-10-CM

## 2020-07-24 DIAGNOSIS — E66.9 OBESITY, UNSPECIFIED: ICD-10-CM

## 2020-07-24 DIAGNOSIS — I31.3 PERICARDIAL EFFUSION (NONINFLAMMATORY): ICD-10-CM

## 2020-07-24 NOTE — DISCUSSION/SUMMARY
[FreeTextEntry1] : 53 YO F janessa hx of Pericardial effusion in past, unprovoked DVT now off eliquis. Pt. here for routine follow up. Ideally patient should be on eliquis to reduce risk of recurrence. Pt. expresses financial constraints. Plan for patient to go to  and apply for financial aid or samples from company. unlikely to consider coumadin if unable to afford DOAC. Additionally, pt. morbidly obese. Discussessed, weight loss, diet and exercise with patient. Plan for patient to return in 1 month

## 2020-07-24 NOTE — REASON FOR VISIT
[Follow-Up - Clinic] : a clinic follow-up of [FreeTextEntry1] : 54 F PMHx of GERD and pericardial effusion/tamponade of unknown etiology s/p IR-guided drainage (Dec 2013), recent unprovoked LLE DVT (7/2019) on Eliquis, here for follow up. Regarding cardiac hx, has NST 2/2016 wnl and TTE in 7/2017 also wnl. Since last visit in 8/2018, other than the recent diagnosis of LLE DVT, pt has been in her USOH. Pt. denies any any chest pain, nausea vomiting. States at times becomes short of breath when walkign long distances. Lives on 3rd floor walk up. Can make up all 3 flights without stopping. \par \par Pt. states that resently she was told to stop taking eliquis by another doctor because she had a negative DVT study.

## 2020-07-24 NOTE — REVIEW OF SYSTEMS
[Fever] : no fever [Headache] : no headache [Chills] : no chills [Feeling Fatigued] : not feeling fatigued [Blurry Vision] : no blurred vision [Seeing Double (Diplopia)] : no diplopia [Eyeglasses] : not currently wearing eyeglasses [Earache] : no earache [Discharge From The Ears] : no discharge from the ears [Mouth Sores] : no mouth sores [Shortness Of Breath] : no shortness of breath [Sore Throat] : no sore throat [Chest Pain] : no chest pain [Cough] : no cough [Palpitations] : no palpitations [Heartburn] : no heartburn [Abdominal Pain] : no abdominal pain [Dysphagia] : no dysphagia [Incontinence] : no incontinence [Dysuria] : no dysuria [Muscle Cramps] : no muscle cramps [Joint Pain] : no joint pain [Skin Lesions] : no skin lesions [Confusion] : no confusion was observed [Skin: A Rash] : no rash: [Excessive Thirst] : no polydipsia [Anxiety] : no anxiety [Easy Bleeding] : no tendency for easy bleeding [Easy Bruising] : no tendency for easy bruising

## 2020-08-14 ENCOUNTER — APPOINTMENT (OUTPATIENT)
Dept: CV DIAGNOSITCS | Facility: HOSPITAL | Age: 55
End: 2020-08-14

## 2020-08-14 ENCOUNTER — OUTPATIENT (OUTPATIENT)
Dept: OUTPATIENT SERVICES | Facility: HOSPITAL | Age: 55
LOS: 1 days | End: 2020-08-14
Payer: SELF-PAY

## 2020-08-14 DIAGNOSIS — I31.3 PERICARDIAL EFFUSION (NONINFLAMMATORY): ICD-10-CM

## 2020-08-14 PROCEDURE — 93306 TTE W/DOPPLER COMPLETE: CPT

## 2020-08-14 PROCEDURE — 93306 TTE W/DOPPLER COMPLETE: CPT | Mod: 26

## 2020-08-18 ENCOUNTER — OUTPATIENT (OUTPATIENT)
Dept: OUTPATIENT SERVICES | Facility: HOSPITAL | Age: 55
LOS: 1 days | End: 2020-08-18
Payer: SELF-PAY

## 2020-08-18 ENCOUNTER — APPOINTMENT (OUTPATIENT)
Dept: INTERNAL MEDICINE | Facility: CLINIC | Age: 55
End: 2020-08-18

## 2020-08-18 VITALS
WEIGHT: 255 LBS | HEIGHT: 61 IN | SYSTOLIC BLOOD PRESSURE: 130 MMHG | OXYGEN SATURATION: 96 % | BODY MASS INDEX: 48.15 KG/M2 | HEART RATE: 85 BPM | DIASTOLIC BLOOD PRESSURE: 70 MMHG

## 2020-08-18 VITALS
HEIGHT: 61 IN | SYSTOLIC BLOOD PRESSURE: 130 MMHG | BODY MASS INDEX: 48.15 KG/M2 | WEIGHT: 255 LBS | DIASTOLIC BLOOD PRESSURE: 70 MMHG

## 2020-08-18 VITALS — HEART RATE: 85 BPM | OXYGEN SATURATION: 96 %

## 2020-08-18 DIAGNOSIS — I10 ESSENTIAL (PRIMARY) HYPERTENSION: ICD-10-CM

## 2020-08-18 DIAGNOSIS — E11.9 TYPE 2 DIABETES MELLITUS WITHOUT COMPLICATIONS: ICD-10-CM

## 2020-08-18 DIAGNOSIS — R30.0 DYSURIA: ICD-10-CM

## 2020-08-18 DIAGNOSIS — E66.01 MORBID (SEVERE) OBESITY DUE TO EXCESS CALORIES: ICD-10-CM

## 2020-08-18 DIAGNOSIS — I82.409 ACUTE EMBOLISM AND THROMBOSIS OF UNSPECIFIED DEEP VEINS OF UNSPECIFIED LOWER EXTREMITY: ICD-10-CM

## 2020-08-18 DIAGNOSIS — I87.2 VENOUS INSUFFICIENCY (CHRONIC) (PERIPHERAL): ICD-10-CM

## 2020-08-18 DIAGNOSIS — I31.3 PERICARDIAL EFFUSION (NONINFLAMMATORY): ICD-10-CM

## 2020-08-18 DIAGNOSIS — Z00.00 ENCOUNTER FOR GENERAL ADULT MEDICAL EXAMINATION WITHOUT ABNORMAL FINDINGS: ICD-10-CM

## 2020-08-18 PROCEDURE — G0463: CPT

## 2020-08-19 NOTE — PHYSICAL EXAM
[No Acute Distress] : no acute distress [Well Developed] : well developed [Well Nourished] : well nourished [Well-Appearing] : well-appearing [PERRL] : pupils equal round and reactive to light [Normal Sclera/Conjunctiva] : normal sclera/conjunctiva [EOMI] : extraocular movements intact [Normal Outer Ear/Nose] : the outer ears and nose were normal in appearance [Normal Oropharynx] : the oropharynx was normal [No Lymphadenopathy] : no lymphadenopathy [Supple] : supple [Thyroid Normal, No Nodules] : the thyroid was normal and there were no nodules present [No Accessory Muscle Use] : no accessory muscle use [No Respiratory Distress] : no respiratory distress  [Normal Rate] : normal rate  [Clear to Auscultation] : lungs were clear to auscultation bilaterally [Regular Rhythm] : with a regular rhythm [No Murmur] : no murmur heard [Normal S1, S2] : normal S1 and S2 [Soft] : abdomen soft [Non Tender] : non-tender [Non-distended] : non-distended [No Masses] : no abdominal mass palpated [No CVA Tenderness] : no CVA  tenderness [Normal] : normal gait, coordination grossly intact, no focal deficits and deep tendon reflexes were 2+ and symmetric [Normal Affect] : the affect was normal [Normal Insight/Judgement] : insight and judgment were intact [de-identified] : Obese  [de-identified] : Could not assess for HSM due to obesity  [de-identified] : trace/1+ edema bilateral lower extremities, + bilateral varicose veins without inflammation [de-identified] : Deferred to following appt.

## 2020-08-19 NOTE — HEALTH RISK ASSESSMENT
[Employed] : employed [Fully functional (bathing, dressing, toileting, transferring, walking, feeding)] : Fully functional (bathing, dressing, toileting, transferring, walking, feeding) [Fully functional (using the telephone, shopping, preparing meals, housekeeping, doing laundry, using] : Fully functional and needs no help or supervision to perform IADLs (using the telephone, shopping, preparing meals, housekeeping, doing laundry, using transportation, managing medications and managing finances) [Change in mental status noted] : No change in mental status noted [Language] : denies difficulty with language [Reports changes in hearing] : Reports no changes in hearing [Handling Complex Tasks] : denies difficulty handling complex tasks [Behavior] : denies difficulty with behavior [Reasoning] : denies difficulty with reasoning [Reports normal functional visual acuity (ie: able to read med bottle)] : Reports poor functional visual acuity.  [Reports changes in vision] : Reports no changes in vision [MammogramDate] : 02/20 [PapSmearDate] : 02/20 [ColonoscopyDate] : 02/2019 [MammogramComments] : Birads 1, next in 1 year  [ColonoscopyComments] : FOBT  [FreeTextEntry2] :

## 2020-08-19 NOTE — HISTORY OF PRESENT ILLNESS
[de-identified] : Patient is a 54 year old female, PMH DVT (2019), pericardial tamponade (2013), obesity, DMII,  presenting for CPE.Since the previous visit last month, she was restarted on eliquis by cardiology as well as started on furosemide. She was also seen by gyn for post-menopausal bleeding, and had a biopsy performed. \par She is not having any complaints at this time. \par \par #Diabetes: She has been following a low carb and low sugar diet. She does not exercise, though is active in her job. She does not check her sugars at home. \par \par #DVT: stopped taking her eliquis after visit with us last month, but was restarted on it by cardiology. No calf swelling or SOB. \par \par

## 2020-08-19 NOTE — ASSESSMENT
[FreeTextEntry1] : Patient is a 53 YO F, PMH pericardial tamponade (2013), unprovoked DVT (2019), DMII, Gerd presenting for CPE. \par \par #DVT:\par -so signs of DVT or PE at this time, and had ultrasound in February showing no DVT. \par -Completed 1 year on Eliquis, stopped by us and then restarted  by cardiology. Medication is no longer a financial issue per the patient \par -Hematology referral as no clear guidelines for unprovoked DVT after 1 year on AC. \par \par #DMII \par -last A1C 6.5. Controlled with diet. \par -Does not check glucose at home. \par -Check urine microalbumin/creatinine today \par -Check lipids today- consider starting a statin given diabetic \par -Referred to diabetes educator \par -last ophthalmologist visit 2 years ago- referral\par -Full diabetic foot exam next visit. \par \par #Morbid Obesity \par -watches her diet but does not exercise\par -referral to medical weight management team \par \par #Vascular Insufficiency \par -leg pain at the end of the day, encouraged to elevate \par -Started on furosemide by cardiology a few weeks ago. \par -BMP checked today\par -vascular referral \par  \par #Pericardial effusion \par -pericardiocentesis in 2013 \par -no further workup necessary per cards \par \par #HCM\par Mammogram: last Feb 2020- next in 1 year \par Colonoscopy: Nov 2018. Per report, FOBT negative Sept 2019 \par Pap: 2019 \par \par

## 2020-08-19 NOTE — REVIEW OF SYSTEMS
[Lower Ext Edema] : lower extremity edema [Chills] : no chills [Fever] : no fever [Fatigue] : no fatigue [Night Sweats] : no night sweats [Recent Change In Weight] : ~T no recent weight change [Vision Problems] : no vision problems [Hearing Loss] : no hearing loss [Hoarseness] : no hoarseness [Sore Throat] : no sore throat [Postnasal Drip] : no postnasal drip [Nasal Discharge] : no nasal discharge [Chest Pain] : no chest pain [Palpitations] : no palpitations [Nausea] : no nausea [Constipation] : no constipation [Incontinence] : no incontinence [Diarrhea] : diarrhea [Melena] : no melena [Vomiting] : no vomiting [Frequency] : no frequency [Joint Pain] : no joint pain [Vaginal Discharge] : no vaginal discharge [Joint Swelling] : no joint swelling [Joint Stiffness] : no joint stiffness [Back Pain] : no back pain [Dizziness] : no dizziness [Fainting] : no fainting [Headache] : no headache [Skin Rash] : no skin rash [Anxiety] : no anxiety [Depression] : no depression [FreeTextEntry5] : Leg swelling at the end of the day when she has been on her feet for an extended period of time

## 2020-08-21 LAB
ANION GAP SERPL CALC-SCNC: 12 MMOL/L
BUN SERPL-MCNC: 12 MG/DL
CALCIUM SERPL-MCNC: 9.2 MG/DL
CHLORIDE SERPL-SCNC: 99 MMOL/L
CHOLEST SERPL-MCNC: 181 MG/DL
CHOLEST/HDLC SERPL: 3.4 RATIO
CO2 SERPL-SCNC: 27 MMOL/L
CREAT SERPL-MCNC: 0.65 MG/DL
CREAT SPEC-SCNC: 134 MG/DL
GLUCOSE SERPL-MCNC: 91 MG/DL
HDLC SERPL-MCNC: 53 MG/DL
LDLC SERPL CALC-MCNC: 113 MG/DL
MICROALBUMIN 24H UR DL<=1MG/L-MCNC: 9.2 MG/DL
MICROALBUMIN/CREAT 24H UR-RTO: 69 MG/G
POTASSIUM SERPL-SCNC: 3.6 MMOL/L
SODIUM SERPL-SCNC: 138 MMOL/L
TRIGL SERPL-MCNC: 75 MG/DL

## 2020-08-25 ENCOUNTER — OUTPATIENT (OUTPATIENT)
Dept: OUTPATIENT SERVICES | Facility: HOSPITAL | Age: 55
LOS: 1 days | End: 2020-08-25

## 2020-08-25 ENCOUNTER — APPOINTMENT (OUTPATIENT)
Dept: ULTRASOUND IMAGING | Facility: CLINIC | Age: 55
End: 2020-08-25

## 2020-08-25 DIAGNOSIS — I10 ESSENTIAL (PRIMARY) HYPERTENSION: ICD-10-CM

## 2020-09-03 ENCOUNTER — APPOINTMENT (OUTPATIENT)
Dept: ULTRASOUND IMAGING | Facility: HOSPITAL | Age: 55
End: 2020-09-03
Payer: COMMERCIAL

## 2020-09-03 ENCOUNTER — RESULT REVIEW (OUTPATIENT)
Age: 55
End: 2020-09-03

## 2020-09-03 ENCOUNTER — OUTPATIENT (OUTPATIENT)
Dept: OUTPATIENT SERVICES | Facility: HOSPITAL | Age: 55
LOS: 1 days | End: 2020-09-03
Payer: SELF-PAY

## 2020-09-03 DIAGNOSIS — N95.0 POSTMENOPAUSAL BLEEDING: ICD-10-CM

## 2020-09-03 DIAGNOSIS — I31.3 PERICARDIAL EFFUSION (NONINFLAMMATORY): ICD-10-CM

## 2020-09-03 DIAGNOSIS — R30.0 DYSURIA: ICD-10-CM

## 2020-09-03 PROCEDURE — 76831 ECHO EXAM UTERUS: CPT | Mod: 26

## 2020-09-03 PROCEDURE — 58340 CATHETER FOR HYSTEROGRAPHY: CPT

## 2020-09-03 PROCEDURE — 76831 ECHO EXAM UTERUS: CPT

## 2020-09-09 ENCOUNTER — OUTPATIENT (OUTPATIENT)
Dept: OUTPATIENT SERVICES | Facility: HOSPITAL | Age: 55
LOS: 1 days | End: 2020-09-09
Payer: SELF-PAY

## 2020-09-09 ENCOUNTER — APPOINTMENT (OUTPATIENT)
Dept: OBGYN | Facility: CLINIC | Age: 55
End: 2020-09-09
Payer: SELF-PAY

## 2020-09-09 VITALS — WEIGHT: 252 LBS | SYSTOLIC BLOOD PRESSURE: 112 MMHG | DIASTOLIC BLOOD PRESSURE: 70 MMHG | BODY MASS INDEX: 47.62 KG/M2

## 2020-09-09 DIAGNOSIS — N76.0 ACUTE VAGINITIS: ICD-10-CM

## 2020-09-09 PROCEDURE — G0463: CPT

## 2020-09-09 PROCEDURE — 99213 OFFICE O/P EST LOW 20 MIN: CPT | Mod: GE

## 2020-09-10 ENCOUNTER — APPOINTMENT (OUTPATIENT)
Dept: OPHTHALMOLOGY | Facility: CLINIC | Age: 55
End: 2020-09-10

## 2020-09-14 ENCOUNTER — OUTPATIENT (OUTPATIENT)
Dept: OUTPATIENT SERVICES | Facility: HOSPITAL | Age: 55
LOS: 1 days | Discharge: HOME | End: 2020-09-14

## 2020-09-14 DIAGNOSIS — I82.221 CHRONIC EMBOLISM AND THROMBOSIS OF INFERIOR VENA CAVA: ICD-10-CM

## 2020-09-15 ENCOUNTER — OUTPATIENT (OUTPATIENT)
Dept: OUTPATIENT SERVICES | Facility: HOSPITAL | Age: 55
LOS: 1 days | Discharge: HOME | End: 2020-09-15

## 2020-09-15 ENCOUNTER — OUTPATIENT (OUTPATIENT)
Dept: OUTPATIENT SERVICES | Facility: HOSPITAL | Age: 55
LOS: 1 days | End: 2020-09-15
Payer: MEDICAID

## 2020-09-15 ENCOUNTER — LABORATORY RESULT (OUTPATIENT)
Age: 55
End: 2020-09-15

## 2020-09-15 ENCOUNTER — RESULT REVIEW (OUTPATIENT)
Age: 55
End: 2020-09-15

## 2020-09-15 ENCOUNTER — APPOINTMENT (OUTPATIENT)
Dept: HEMATOLOGY ONCOLOGY | Facility: CLINIC | Age: 55
End: 2020-09-15
Payer: COMMERCIAL

## 2020-09-15 VITALS
TEMPERATURE: 98.4 F | DIASTOLIC BLOOD PRESSURE: 79 MMHG | OXYGEN SATURATION: 99 % | WEIGHT: 250.67 LBS | BODY MASS INDEX: 47.36 KG/M2 | HEART RATE: 66 BPM | RESPIRATION RATE: 16 BRPM | SYSTOLIC BLOOD PRESSURE: 126 MMHG

## 2020-09-15 DIAGNOSIS — I82.221 CHRONIC EMBOLISM AND THROMBOSIS OF INFERIOR VENA CAVA: ICD-10-CM

## 2020-09-15 DIAGNOSIS — N84.0 POLYP OF CORPUS UTERI: ICD-10-CM

## 2020-09-15 DIAGNOSIS — I82.409 ACUTE EMBOLISM AND THROMBOSIS OF UNSPECIFIED DEEP VEINS OF UNSPECIFIED LOWER EXTREMITY: ICD-10-CM

## 2020-09-15 LAB
BASOPHILS # BLD AUTO: 0.04 K/UL — SIGNIFICANT CHANGE UP (ref 0–0.2)
BASOPHILS NFR BLD AUTO: 0.6 % — SIGNIFICANT CHANGE UP (ref 0–2)
EOSINOPHIL # BLD AUTO: 0.26 K/UL — SIGNIFICANT CHANGE UP (ref 0–0.5)
EOSINOPHIL NFR BLD AUTO: 4.1 % — SIGNIFICANT CHANGE UP (ref 0–6)
HCT VFR BLD CALC: 40.8 % — SIGNIFICANT CHANGE UP (ref 34.5–45)
HGB BLD-MCNC: 13 G/DL — SIGNIFICANT CHANGE UP (ref 11.5–15.5)
IMM GRANULOCYTES NFR BLD AUTO: 0.3 % — SIGNIFICANT CHANGE UP (ref 0–1.5)
LUPUS ANTICOAGULANT CASCADE REFLEX: NORMAL
LYMPHOCYTES # BLD AUTO: 2.13 K/UL — SIGNIFICANT CHANGE UP (ref 1–3.3)
LYMPHOCYTES # BLD AUTO: 33.8 % — SIGNIFICANT CHANGE UP (ref 13–44)
MCHC RBC-ENTMCNC: 28.3 PG — SIGNIFICANT CHANGE UP (ref 27–34)
MCHC RBC-ENTMCNC: 31.9 G/DL — LOW (ref 32–36)
MCV RBC AUTO: 88.9 FL — SIGNIFICANT CHANGE UP (ref 80–100)
MONOCYTES # BLD AUTO: 0.44 K/UL — SIGNIFICANT CHANGE UP (ref 0–0.9)
MONOCYTES NFR BLD AUTO: 7 % — SIGNIFICANT CHANGE UP (ref 2–14)
NEUTROPHILS # BLD AUTO: 3.42 K/UL — SIGNIFICANT CHANGE UP (ref 1.8–7.4)
NEUTROPHILS NFR BLD AUTO: 54.2 % — SIGNIFICANT CHANGE UP (ref 43–77)
NRBC # BLD: 0 /100 WBCS — SIGNIFICANT CHANGE UP (ref 0–0)
PLATELET # BLD AUTO: 304 K/UL — SIGNIFICANT CHANGE UP (ref 150–400)
RBC # BLD: 4.59 M/UL — SIGNIFICANT CHANGE UP (ref 3.8–5.2)
RBC # FLD: 13.5 % — SIGNIFICANT CHANGE UP (ref 10.3–14.5)
WBC # BLD: 6.31 K/UL — SIGNIFICANT CHANGE UP (ref 3.8–10.5)
WBC # FLD AUTO: 6.31 K/UL — SIGNIFICANT CHANGE UP (ref 3.8–10.5)

## 2020-09-15 PROCEDURE — 99204 OFFICE O/P NEW MOD 45 MIN: CPT

## 2020-09-15 PROCEDURE — 81240 F2 GENE: CPT

## 2020-09-15 PROCEDURE — 81241 F5 GENE: CPT

## 2020-09-15 PROCEDURE — G0452: CPT | Mod: 26

## 2020-09-15 NOTE — PHYSICAL EXAM
[Normal] : affect appropriate [Restricted in physically strenuous activity but ambulatory and able to carry out work of a light or sedentary nature] : Status 1- Restricted in physically strenuous activity but ambulatory and able to carry out work of a light or sedentary nature, e.g., light house work, office work [Obese] : obese

## 2020-09-15 NOTE — REVIEW OF SYSTEMS
[Negative] : Allergic/Immunologic [SOB on Exertion] : shortness of breath during exertion [Joint Pain] : joint pain

## 2020-09-17 DIAGNOSIS — R79.1 ABNORMAL COAGULATION PROFILE: ICD-10-CM

## 2020-09-17 LAB
AT III PPP CHRO-ACNC: 100 %
B2 GLYCOPROT1 AB SER QL: NEGATIVE
CARDIOLIPIN AB SER IA-ACNC: NEGATIVE
PROT C PPP CHRO-ACNC: 128 %

## 2020-09-17 NOTE — CONSULT LETTER
[Dear  ___] : Dear  [unfilled], [Consult Letter:] : I had the pleasure of evaluating your patient, [unfilled]. [Please see my note below.] : Please see my note below. [Sincerely,] : Sincerely, [Consult Closing:] : Thank you very much for allowing me to participate in the care of this patient.  If you have any questions, please do not hesitate to contact me. [FreeTextEntry2] : Dr Malia Ocampo

## 2020-09-17 NOTE — HISTORY OF PRESENT ILLNESS
[0 - No Distress] : Distress Level: 0 [de-identified] : 55 yo woman with PMHx of  unprovoked left DVT in 6/ 2019 on Eliquis  5 mg BID, history of varicose veins,  scheduled to get a hysteroscopy and polypectomy, referred for hypercoagulable work up. \par Patient denies previous history of blood clots or family history of blood clots. Denies fever, night sweats, weight loss.

## 2020-09-17 NOTE — ASSESSMENT
[FreeTextEntry1] : 53 yo woman with PMHx of  unprovoked left DVT in 6/ 2019 on Eliquis  5 mg BID, history of varicose veins,  scheduled to get a hysteroscopy and polypectomy, referred for hypercoagulable work up. \par Patient denies previous history of blood clots or family history of blood clots. Denies fever, night sweats, weight loss. \par \par I had a detailed discussion today with the patient regarding the natural history, epidemiology, diagnosis, and treatment of  hypercoagulable state. I reviewed her laboratory  studies in detail today. I then discussed  the importance of doing a hypercoagulable work up to determine the need for anticoagulation at this time. Patient has been on AC for one year for unprovoked DVT. I answered all her  questions to satisfaction.\par \par Greater than 50% of the encounter time was spent on counseling and coordination of care for hypercoagulable state and I have spent  45   minutes of face to face time with the patient.\par \par RTC prn. \par

## 2020-09-18 LAB — PTR INTERPRETATION: SIGNIFICANT CHANGE UP

## 2020-09-21 ENCOUNTER — APPOINTMENT (OUTPATIENT)
Dept: INTERNAL MEDICINE | Facility: CLINIC | Age: 55
End: 2020-09-21

## 2020-09-21 LAB — PROT S FREE PPP-ACNC: 78 % NORMAL

## 2020-09-22 ENCOUNTER — APPOINTMENT (OUTPATIENT)
Dept: INTERNAL MEDICINE | Facility: CLINIC | Age: 55
End: 2020-09-22

## 2020-09-23 LAB — DNA PLOIDY SPEC FC-IMP: SIGNIFICANT CHANGE UP

## 2020-09-30 ENCOUNTER — APPOINTMENT (OUTPATIENT)
Dept: ULTRASOUND IMAGING | Facility: CLINIC | Age: 55
End: 2020-09-30

## 2020-10-08 ENCOUNTER — APPOINTMENT (OUTPATIENT)
Dept: INTERNAL MEDICINE | Facility: CLINIC | Age: 55
End: 2020-10-08
Payer: MEDICAID

## 2020-10-08 ENCOUNTER — OUTPATIENT (OUTPATIENT)
Dept: OUTPATIENT SERVICES | Facility: HOSPITAL | Age: 55
LOS: 1 days | End: 2020-10-08
Payer: SELF-PAY

## 2020-10-08 DIAGNOSIS — Z23 ENCOUNTER FOR IMMUNIZATION: ICD-10-CM

## 2020-10-08 DIAGNOSIS — I10 ESSENTIAL (PRIMARY) HYPERTENSION: ICD-10-CM

## 2020-10-08 PROCEDURE — ZZZZZ: CPT

## 2020-10-08 PROCEDURE — 90656 IIV3 VACC NO PRSV 0.5 ML IM: CPT

## 2020-10-08 PROCEDURE — G0008: CPT

## 2020-11-03 ENCOUNTER — OUTPATIENT (OUTPATIENT)
Dept: OUTPATIENT SERVICES | Facility: HOSPITAL | Age: 55
LOS: 1 days | End: 2020-11-03
Payer: SELF-PAY

## 2020-11-03 ENCOUNTER — APPOINTMENT (OUTPATIENT)
Dept: INTERNAL MEDICINE | Facility: CLINIC | Age: 55
End: 2020-11-03

## 2020-11-03 VITALS
HEIGHT: 61 IN | HEART RATE: 90 BPM | DIASTOLIC BLOOD PRESSURE: 80 MMHG | WEIGHT: 245.5 LBS | SYSTOLIC BLOOD PRESSURE: 120 MMHG | OXYGEN SATURATION: 96 % | BODY MASS INDEX: 46.35 KG/M2

## 2020-11-03 DIAGNOSIS — I10 ESSENTIAL (PRIMARY) HYPERTENSION: ICD-10-CM

## 2020-11-03 PROCEDURE — G0463: CPT

## 2020-11-04 ENCOUNTER — APPOINTMENT (OUTPATIENT)
Dept: ULTRASOUND IMAGING | Facility: CLINIC | Age: 55
End: 2020-11-04
Payer: COMMERCIAL

## 2020-11-04 ENCOUNTER — OUTPATIENT (OUTPATIENT)
Dept: OUTPATIENT SERVICES | Facility: HOSPITAL | Age: 55
LOS: 1 days | End: 2020-11-04
Payer: SELF-PAY

## 2020-11-04 ENCOUNTER — RESULT REVIEW (OUTPATIENT)
Age: 55
End: 2020-11-04

## 2020-11-04 DIAGNOSIS — I82.409 ACUTE EMBOLISM AND THROMBOSIS OF UNSPECIFIED DEEP VEINS OF UNSPECIFIED LOWER EXTREMITY: ICD-10-CM

## 2020-11-04 DIAGNOSIS — I10 ESSENTIAL (PRIMARY) HYPERTENSION: ICD-10-CM

## 2020-11-04 PROCEDURE — 93971 EXTREMITY STUDY: CPT

## 2020-11-04 PROCEDURE — 93971 EXTREMITY STUDY: CPT | Mod: 26

## 2020-11-04 RX ORDER — FUROSEMIDE 40 MG/1
40 TABLET ORAL DAILY
Qty: 15 | Refills: 3 | Status: DISCONTINUED | COMMUNITY
Start: 2020-07-22 | End: 2020-11-04

## 2020-11-04 RX ORDER — APIXABAN 5 MG/1
5 TABLET, FILM COATED ORAL
Qty: 180 | Refills: 1 | Status: DISCONTINUED | COMMUNITY
Start: 2019-07-30 | End: 2020-11-04

## 2020-11-04 RX ORDER — APIXABAN 5 MG/1
5 TABLET, FILM COATED ORAL TWICE DAILY
Qty: 180 | Refills: 1 | Status: DISCONTINUED | OUTPATIENT
Start: 2020-07-28 | End: 2020-11-04

## 2020-11-05 ENCOUNTER — OUTPATIENT (OUTPATIENT)
Dept: OUTPATIENT SERVICES | Facility: HOSPITAL | Age: 55
LOS: 1 days | End: 2020-11-05
Payer: SELF-PAY

## 2020-11-05 ENCOUNTER — APPOINTMENT (OUTPATIENT)
Dept: INTERNAL MEDICINE | Facility: CLINIC | Age: 55
End: 2020-11-05

## 2020-11-05 VITALS
WEIGHT: 245 LBS | SYSTOLIC BLOOD PRESSURE: 110 MMHG | DIASTOLIC BLOOD PRESSURE: 70 MMHG | BODY MASS INDEX: 46.26 KG/M2 | HEIGHT: 61 IN

## 2020-11-05 DIAGNOSIS — I10 ESSENTIAL (PRIMARY) HYPERTENSION: ICD-10-CM

## 2020-11-05 DIAGNOSIS — M25.572 PAIN IN LEFT ANKLE AND JOINTS OF LEFT FOOT: ICD-10-CM

## 2020-11-05 PROCEDURE — G0463: CPT

## 2020-11-06 NOTE — ASSESSMENT
[FreeTextEntry1] : 53 yo F with a PMH of unprovoked DVT off eliquis following negative hypercoagulable work up, T2DM, obesity class III who presents.\par \par DMT2:\par -A1C today 6.6\par -c/w metformin 500mg QD and lisinopril 2.5mg QD for proteinuria\par -cont to f/u weight management\par \par Left knee pain:\par -likely work related ligament tear/muscle strain vs. baker cyst\par -DVT study negative\par -Knee xray pending\par -c/w Voltaren gel QD to affected area\par -PT referral given\par \par RTO in 5 weeks for knee pain f/u

## 2020-11-06 NOTE — HISTORY OF PRESENT ILLNESS
[FreeTextEntry1] : Followup [de-identified] : 53 yo F with a PMH of unprovoked DVT off eliquis following negative hypercoagulable work up, T2DM, obesity class III who presents for f/u.\par \par DM: started on Metformin 500mg QD yesterday. Reports that she is trying to eats healthy, mostly vegetables, salads and fruits and fish. Reducing fatty food. Also cutting down desserts as well. Lost about 5-10lbs. No soda. Denies fever, chill, CP, SOB, numbness/tingling. \par \par Left knee pain: reports 3 week h/o left knee pain, reports that she moved it in a weird way and has been having 5/10 constant pain in the medial area since with weight bearing/flexion since. Took Ibuprofen with symptom relief. No other trauma. Had DVT studies yesterday which was negative. Still able to work as a , no assisted devices needed.

## 2020-11-06 NOTE — PHYSICAL EXAM
[No Acute Distress] : no acute distress [Well Nourished] : well nourished [PERRL] : pupils equal round and reactive to light [Normal Oropharynx] : the oropharynx was normal [No Lymphadenopathy] : no lymphadenopathy [Supple] : supple [No Respiratory Distress] : no respiratory distress  [No Accessory Muscle Use] : no accessory muscle use [Clear to Auscultation] : lungs were clear to auscultation bilaterally [Regular Rhythm] : with a regular rhythm [Normal S1, S2] : normal S1 and S2 [Soft] : abdomen soft [Non Tender] : non-tender [Normal Supraclavicular Nodes] : no supraclavicular lymphadenopathy [Normal Anterior Cervical Nodes] : no anterior cervical lymphadenopathy [No Spinal Tenderness] : no spinal tenderness [No Joint Swelling] : no joint swelling [Grossly Normal Strength/Tone] : grossly normal strength/tone [No Rash] : no rash [de-identified] : obese female [de-identified] : minimal edema of the ankles noted,b /l varicose veins  [de-identified] : Full RMO, 5/5 strength in b/l LE. Normal patella, no laxity of the left knee. Isolated TTP in the medial posterior knee. No baker cyst felt

## 2020-11-06 NOTE — REVIEW OF SYSTEMS
[Muscle Pain] : muscle pain [Fever] : no fever [Chills] : no chills [Discharge] : no discharge [Vision Problems] : no vision problems [Earache] : no earache [Nasal Discharge] : no nasal discharge [Chest Pain] : no chest pain [Palpitations] : no palpitations [Leg Claudication] : no leg claudication [Shortness Of Breath] : no shortness of breath [Wheezing] : no wheezing [Cough] : no cough [Abdominal Pain] : no abdominal pain [Nausea] : no nausea [Constipation] : no constipation [Vomiting] : no vomiting [Melena] : no melena [Dysuria] : no dysuria [Hematuria] : no hematuria [Joint Pain] : no joint pain [Headache] : no headache [Memory Loss] : no memory loss [FreeTextEntry9] : left knee pain

## 2020-11-06 NOTE — END OF VISIT
[] : Resident [FreeTextEntry3] : Patient was seen in office yesterday by obesity medicine. She is here for general f/u. Knee pain addressed on today's visit. Subacute pain in the setting of reported injury w/ only mild improvement. There is no evidence of recurrent DVT w/ negative doppler yesterday. Ligament strain may be most likely. She has significant risk for OA given obesity/age, but reported timeline of injury is more consistent w/ acute/subacute pain. Can start w/ xray, PT, conservative pain control. If no improvement/worsening, will likely need ortho evaluation and consideration for further imaging. Will need close f/u w/ medicine clinic.

## 2020-11-09 ENCOUNTER — RESULT REVIEW (OUTPATIENT)
Age: 55
End: 2020-11-09

## 2020-11-09 ENCOUNTER — APPOINTMENT (OUTPATIENT)
Dept: RADIOLOGY | Facility: CLINIC | Age: 55
End: 2020-11-09
Payer: COMMERCIAL

## 2020-11-09 ENCOUNTER — OUTPATIENT (OUTPATIENT)
Dept: OUTPATIENT SERVICES | Facility: HOSPITAL | Age: 55
LOS: 1 days | End: 2020-11-09
Payer: SELF-PAY

## 2020-11-09 DIAGNOSIS — I10 ESSENTIAL (PRIMARY) HYPERTENSION: ICD-10-CM

## 2020-11-09 PROCEDURE — 73562 X-RAY EXAM OF KNEE 3: CPT

## 2020-11-09 PROCEDURE — 73562 X-RAY EXAM OF KNEE 3: CPT | Mod: 26,LT

## 2020-11-09 NOTE — HISTORY OF PRESENT ILLNESS
[Improved Health] : Improved health [Cut/Track Calories] : cut/track calories [Motivation] : motivation [I usually sleep 6-8 hours] : I usually sleep 6-8 hours [Breakfast] : breakfast [Lunch] : lunch [Dinner] : dinner [Afternoon] : afternoon [FreeTextEntry1] : Ms. Da Silva is a 55 yo F with a PMH of unprovoked DVT off eliquis following negative hypercoagulable work up, T2DM, obesity class III who presents to weight management clinic. She has been attempting to lose weight by changing her diet primarily. Instead of eating fried pork, she has focused on eating more lean meats. For breakfast, she has coffee with toast. For lunch and dinner, she mainly eats a carbohydrate with a lean animal protein. She has not emphasized fruits and/or vegetables in her new diet. She has had success with this diet with a ~10 lb weight loss since August. She sleeps ~7 hours per night with ~1 awakening at night that lasts a short period of time before she falls back asleep. She wakes up feeling refreshed. Her activity is limited generally - she just walks around her apartment and does not take part in dedicated exercise.\par \par Acutely, she complains of L knee pain that limits her activity in the last 3 weeks. She thinks it is different in quality than her DVT. It is 5/10, constant, worse with flexion and located in the medial/posterior aspect of her L knee. It has caused her to limp recently although she can still ambulate without assistive devices. She has no chest pain, fever, or shortness of breath.\par \par She works as a house keeper. She thinks that taking care of her grand children are one of the biggest barriers to a healthy diet because cooking a unique meal for herself is more work than eating what her grand children would prefer to eat.

## 2020-11-09 NOTE — PHYSICAL EXAM
[Obese] : obese [Normal] : normoactive bowel sounds, soft and nontender, no hepatosplenomegaly or masses appreciated [de-identified] : Varicose veins on bilateral extremity. At the area of swelling, veins appear more engorged. Increased rubor but not rising to level of erythema seen in LLE most prominently distal lateral aspect. [de-identified] : Medial posterior swelling of L knee, engorged varicose veins as above, increased color as above. No edema. No effusion. No crepitus. Normal rusty. Normal valgus stress bilaterally. Varus normal on R. Varus elicits tenderness on L knee.

## 2020-11-09 NOTE — ASSESSMENT
[FreeTextEntry1] : 55 yo F with class III obesity improving with self-implemented dietary changes presenting to weight clinic c/b L knee pain with ddx including MCL or PCL injury, baker's cyst, osteoarthritis, or DVT.

## 2020-11-10 ENCOUNTER — APPOINTMENT (OUTPATIENT)
Dept: OPHTHALMOLOGY | Facility: CLINIC | Age: 55
End: 2020-11-10

## 2020-11-11 ENCOUNTER — NON-APPOINTMENT (OUTPATIENT)
Age: 55
End: 2020-11-11

## 2020-11-11 DIAGNOSIS — M25.569 PAIN IN UNSPECIFIED KNEE: ICD-10-CM

## 2020-11-11 DIAGNOSIS — E66.01 MORBID (SEVERE) OBESITY DUE TO EXCESS CALORIES: ICD-10-CM

## 2020-11-11 DIAGNOSIS — E11.9 TYPE 2 DIABETES MELLITUS WITHOUT COMPLICATIONS: ICD-10-CM

## 2020-11-12 DIAGNOSIS — M25.572 PAIN IN LEFT ANKLE AND JOINTS OF LEFT FOOT: ICD-10-CM

## 2020-11-12 DIAGNOSIS — E11.9 TYPE 2 DIABETES MELLITUS WITHOUT COMPLICATIONS: ICD-10-CM

## 2020-11-16 ENCOUNTER — APPOINTMENT (OUTPATIENT)
Dept: VASCULAR SURGERY | Facility: HOSPITAL | Age: 55
End: 2020-11-16
Payer: COMMERCIAL

## 2020-11-16 ENCOUNTER — OUTPATIENT (OUTPATIENT)
Dept: OUTPATIENT SERVICES | Facility: HOSPITAL | Age: 55
LOS: 1 days | End: 2020-11-16
Payer: SELF-PAY

## 2020-11-16 VITALS
WEIGHT: 245 LBS | SYSTOLIC BLOOD PRESSURE: 117 MMHG | DIASTOLIC BLOOD PRESSURE: 75 MMHG | BODY MASS INDEX: 46.26 KG/M2 | HEART RATE: 61 BPM | TEMPERATURE: 96.6 F | HEIGHT: 61 IN

## 2020-11-16 DIAGNOSIS — I82.409 ACUTE EMBOLISM AND THROMBOSIS OF UNSPECIFIED DEEP VEINS OF UNSPECIFIED LOWER EXTREMITY: ICD-10-CM

## 2020-11-16 DIAGNOSIS — I73.9 PERIPHERAL VASCULAR DISEASE, UNSPECIFIED: ICD-10-CM

## 2020-11-16 DIAGNOSIS — Z87.09 PERSONAL HISTORY OF OTHER DISEASES OF THE RESPIRATORY SYSTEM: ICD-10-CM

## 2020-11-16 DIAGNOSIS — I83.899 VARICOSE VEINS OF UNSPECIFIED LOWER EXTREMITY WITH OTHER COMPLICATIONS: ICD-10-CM

## 2020-11-16 DIAGNOSIS — I83.90 ASYMPTOMATIC VARICOSE VEINS OF UNSPECIFIED LOWER EXTREMITY: ICD-10-CM

## 2020-11-16 DIAGNOSIS — I83.893 VARICOSE VEINS OF BILATERAL LOWER EXTREMITIES WITH OTHER COMPLICATIONS: ICD-10-CM

## 2020-11-16 PROCEDURE — 99202 OFFICE O/P NEW SF 15 MIN: CPT | Mod: GC

## 2020-11-16 PROCEDURE — G0463: CPT

## 2020-11-16 NOTE — HISTORY OF PRESENT ILLNESS
[FreeTextEntry1] : 56yo female PMH of DVT (07/2019) unprovoked, HTN, and DM who presents with venous insufficiency. Patient states that in 2019 had a left lower extremity DVt for which she took 3months of eliquis she had a hypercoagulable and malignant work up done which were negative. She has recently over onth month had worsening LEFT knee pain and RIGHT foot pain; she denies pain worse at night, denies swelling, denies wounds. She has no history of trauma and/or surgery. She had a recent left knee XR (negative) and recent LLE duplex (negative). She otherwise states she's had longstanding varicosities to the thigh b/l for over 15 years that do not bother her.\par \par She works as a maid and the pain and varicose veins minimally affect her daily living. She has worn compression stockings before that helped and would like to try again. She is also currently working with obesity medicine to improve diet and loss weight.

## 2020-11-16 NOTE — PHYSICAL EXAM
[Normal Breath Sounds] : Normal breath sounds [Normal Heart Sounds] : normal heart sounds [Normal Rate and Rhythm] : normal rate and rhythm [Alert] : alert [Oriented to Person] : oriented to person [Oriented to Place] : oriented to place [Calm] : calm [2+] : left 2+ [Varicose Veins Of Lower Extremities] : bilaterally [Ankle Swelling On The Right] : mild [JVD] : no jugular venous distention  [Ankle Swelling (On Exam)] : not present [] : not present [Abdomen Masses] : No abdominal masses [Abdomen Tenderness] : ~T ~M No abdominal tenderness [de-identified] : well appearing female ambulates unassisted [de-identified] : wnl [de-identified] : soft [FreeTextEntry1] : LLE: spider veins, and mild varicose veins malleolus to thigh; NO wounds NO edema NO venous skin changes\par RLE: spider veins, and mild varicose veins malleolus to thigh; NO wounds NO edema NO venous skin changes [de-identified] : n/a [de-identified] : n/a [de-identified] : 5/5 strength [de-identified] : answers appropriately

## 2020-11-16 NOTE — ASSESSMENT
[FreeTextEntry1] : 56yo female with obesity, DM, history of DVT who presents with bilateral venous insufficiency (left > right) and varicose veins\par -discussed the importance of weight loss and diet \par -discussed and prescription given for compression stockings (15-20mmHg)\par -Venous duplex of ivc, iliac veins and b/l lower extremity with reflux study to assess severity of insufficiency and to assess potential proximal source\par -RTC in 1 month

## 2020-12-10 ENCOUNTER — RESULT REVIEW (OUTPATIENT)
Age: 55
End: 2020-12-10

## 2020-12-10 ENCOUNTER — OUTPATIENT (OUTPATIENT)
Dept: OUTPATIENT SERVICES | Facility: HOSPITAL | Age: 55
LOS: 1 days | End: 2020-12-10
Payer: SELF-PAY

## 2020-12-10 ENCOUNTER — APPOINTMENT (OUTPATIENT)
Dept: ULTRASOUND IMAGING | Facility: HOSPITAL | Age: 55
End: 2020-12-10
Payer: COMMERCIAL

## 2020-12-10 DIAGNOSIS — Z87.09 PERSONAL HISTORY OF OTHER DISEASES OF THE RESPIRATORY SYSTEM: ICD-10-CM

## 2020-12-10 DIAGNOSIS — I82.409 ACUTE EMBOLISM AND THROMBOSIS OF UNSPECIFIED DEEP VEINS OF UNSPECIFIED LOWER EXTREMITY: ICD-10-CM

## 2020-12-10 DIAGNOSIS — I83.90 ASYMPTOMATIC VARICOSE VEINS OF UNSPECIFIED LOWER EXTREMITY: ICD-10-CM

## 2020-12-10 DIAGNOSIS — I83.893 VARICOSE VEINS OF BILATERAL LOWER EXTREMITIES WITH OTHER COMPLICATIONS: ICD-10-CM

## 2020-12-10 DIAGNOSIS — I83.899 VARICOSE VEINS OF UNSPECIFIED LOWER EXTREMITY WITH OTHER COMPLICATIONS: ICD-10-CM

## 2020-12-10 PROCEDURE — 93970 EXTREMITY STUDY: CPT | Mod: 26

## 2020-12-10 PROCEDURE — 93978 VASCULAR STUDY: CPT | Mod: 26

## 2020-12-10 PROCEDURE — 93970 EXTREMITY STUDY: CPT

## 2020-12-10 PROCEDURE — 93978 VASCULAR STUDY: CPT

## 2020-12-21 ENCOUNTER — OUTPATIENT (OUTPATIENT)
Dept: OUTPATIENT SERVICES | Facility: HOSPITAL | Age: 55
LOS: 1 days | End: 2020-12-21
Payer: SELF-PAY

## 2020-12-21 ENCOUNTER — APPOINTMENT (OUTPATIENT)
Dept: VASCULAR SURGERY | Facility: HOSPITAL | Age: 55
End: 2020-12-21

## 2020-12-21 VITALS
SYSTOLIC BLOOD PRESSURE: 109 MMHG | TEMPERATURE: 97.8 F | BODY MASS INDEX: 46.26 KG/M2 | RESPIRATION RATE: 14 BRPM | DIASTOLIC BLOOD PRESSURE: 72 MMHG | HEIGHT: 61 IN | HEART RATE: 61 BPM | WEIGHT: 245 LBS

## 2020-12-21 DIAGNOSIS — I73.9 PERIPHERAL VASCULAR DISEASE, UNSPECIFIED: ICD-10-CM

## 2020-12-21 DIAGNOSIS — I87.2 VENOUS INSUFFICIENCY (CHRONIC) (PERIPHERAL): ICD-10-CM

## 2020-12-21 PROCEDURE — G0463: CPT

## 2020-12-23 DIAGNOSIS — I87.2 VENOUS INSUFFICIENCY (CHRONIC) (PERIPHERAL): ICD-10-CM

## 2020-12-23 NOTE — HISTORY OF PRESENT ILLNESS
[FreeTextEntry1] : 11/16/2020: 56yo female PMH of DVT (07/2019) unprovoked, HTN, and DM who presents with venous insufficiency. Patient states that in 2019 had a left lower extremity DVt for which she took 3months of eliquis she had a hypercoagulable and malignant work up done which were negative. She has recently over onth month had worsening LEFT knee pain and RIGHT foot pain; she denies pain worse at night, denies swelling, denies wounds. She has no history of trauma and/or surgery. She had a recent left knee XR (negative) and recent LLE duplex (negative). She otherwise states she's had longstanding varicosities to the thigh b/l for over 15 years that do not bother her\par \par \par 12/10/2020: Duplex:No evidence of deep venous thrombosis in either lower extremity. Severe reflux within the left gastrocnemius vein at the proximal and mid left calf level corresponding to patient's area of pain. [de-identified] : Since last visit patient states all symptoms are generally unchanged. She has not lost any weight and has not gotten the compression stockings. She continues to have swelling and pain in b/l lower extremities that’s worse at end of day. Left worse than right. Otherwise no new complaints at this time.

## 2020-12-23 NOTE — ASSESSMENT
[FreeTextEntry1] : 56yo female with obesity, DM, history of DVT with bilateral venous insufficiency (left > right); duplex shows only reflux in LEFT calf superficial veins\par -again discussed the importance of weight loss and diet; patient being seen by obesity medicine next visit in January\par -again prescription given for compression stockings (15-20mmHg)\par -RTC in 6 months and sooner if any changes
good

## 2020-12-23 NOTE — PHYSICAL EXAM
[Normal Breath Sounds] : Normal breath sounds [Normal Heart Sounds] : normal heart sounds [Normal Rate and Rhythm] : normal rate and rhythm [2+] : left 2+ [Varicose Veins Of Lower Extremities] : bilaterally [Ankle Swelling On The Right] : mild [Alert] : alert [Oriented to Person] : oriented to person [Oriented to Place] : oriented to place [Calm] : calm [JVD] : no jugular venous distention  [Ankle Swelling (On Exam)] : not present [] : not present [Abdomen Masses] : No abdominal masses [Abdomen Tenderness] : ~T ~M No abdominal tenderness [Purpura] : no purpura  [Petechiae] : no petechiae [Skin Ulcer] : no ulcer [Skin Induration] : no induration [de-identified] : well appearing female ambulates unassisted [de-identified] : wnl [de-identified] : giannil [FreeTextEntry1] : LLE: spider veins, and mild varicose veins malleolus to thigh; NO wounds NO edema NO venous skin changes\par RLE: spider veins, and mild varicose veins malleolus to thigh; NO wounds NO edema NO venous skin changes [de-identified] : n/a [de-identified] : n/a [de-identified] : 5/5 strength [de-identified] : answers appropriately

## 2021-01-12 ENCOUNTER — APPOINTMENT (OUTPATIENT)
Dept: INTERNAL MEDICINE | Facility: CLINIC | Age: 56
End: 2021-01-12

## 2021-01-14 ENCOUNTER — APPOINTMENT (OUTPATIENT)
Dept: INTERNAL MEDICINE | Facility: CLINIC | Age: 56
End: 2021-01-14

## 2021-01-14 ENCOUNTER — OUTPATIENT (OUTPATIENT)
Dept: OUTPATIENT SERVICES | Facility: HOSPITAL | Age: 56
LOS: 1 days | End: 2021-01-14
Payer: SELF-PAY

## 2021-01-14 ENCOUNTER — NON-APPOINTMENT (OUTPATIENT)
Age: 56
End: 2021-01-14

## 2021-01-14 VITALS
DIASTOLIC BLOOD PRESSURE: 80 MMHG | BODY MASS INDEX: 46.82 KG/M2 | WEIGHT: 248 LBS | OXYGEN SATURATION: 96 % | HEIGHT: 61 IN | HEART RATE: 71 BPM | SYSTOLIC BLOOD PRESSURE: 110 MMHG

## 2021-01-14 DIAGNOSIS — R73.03 PREDIABETES.: ICD-10-CM

## 2021-01-14 DIAGNOSIS — I10 ESSENTIAL (PRIMARY) HYPERTENSION: ICD-10-CM

## 2021-01-14 DIAGNOSIS — Z86.010 PERSONAL HISTORY OF COLONIC POLYPS: ICD-10-CM

## 2021-01-14 DIAGNOSIS — Z98.890 OTHER SPECIFIED POSTPROCEDURAL STATES: ICD-10-CM

## 2021-01-14 DIAGNOSIS — Z01.419 ENCOUNTER FOR GYNECOLOGICAL EXAMINATION (GENERAL) (ROUTINE) W/OUT ABNORMAL FINDINGS: ICD-10-CM

## 2021-01-14 PROCEDURE — G0463: CPT | Mod: 25

## 2021-01-14 PROCEDURE — 90471 IMMUNIZATION ADMIN: CPT

## 2021-01-14 PROCEDURE — 90750 HZV VACC RECOMBINANT IM: CPT

## 2021-01-14 RX ORDER — DICLOFENAC SODIUM 1% 10 MG/G
1 GEL TOPICAL DAILY
Qty: 2 | Refills: 0 | Status: DISCONTINUED | COMMUNITY
Start: 2020-11-05 | End: 2021-01-14

## 2021-01-15 NOTE — REVIEW OF SYSTEMS
[Earache] : earache [Sore Throat] : sore throat [Lower Ext Edema] : lower extremity edema [Heartburn] : heartburn [Joint Pain] : joint pain [Muscle Pain] : muscle pain [Nail Changes] : nail changes [Negative] : Psychiatric [Hearing Loss] : no hearing loss [Nosebleed] : no nosebleeds [Hoarseness] : no hoarseness [Nasal Discharge] : no nasal discharge [Postnasal Drip] : no postnasal drip [Chest Pain] : no chest pain [Palpitations] : no palpitations [Leg Claudication] : no leg claudication [Abdominal Pain] : no abdominal pain [Nausea] : no nausea [Constipation] : no constipation [Diarrhea] : diarrhea [Vomiting] : no vomiting [Melena] : no melena [Muscle Weakness] : no muscle weakness [Back Pain] : no back pain [Itching] : no itching [Skin Rash] : no skin rash [FreeTextEntry9] : L knee pain

## 2021-01-15 NOTE — ASSESSMENT
[FreeTextEntry1] : 55F w/ unprovoked DVT s/p Eliquis x 1 year (d/c'ed 2/2 neg hypercoagulable workup), T2DM, obesity, b/l venous insufficiency and pericardial tamponade (2013), presented for follow up. \par \par DM\par - A1C 6 today\par - Increased metformin to 500 mg BID for weight management\par - C/w dietary modification and activities\par - Offered diabetic education referral for glucometer use and diet modification; patient declined. \par - C/w Lisinopril 2.5 mg for proteinuria\par - DM foot exam wnl\par \par Obesity\par - Seen by weight management clinic\par - Increased metformin to 500 mg BID\par - C/w dietary modification and exercises\par \par GERD\par - Recent worsening of nighttime symptoms, will trial Pepcid before bedtime for now before initiating standing PPI\par - Educated on weight loss, and modification on diet (timing before bedtime, smaller portion, etc)\par - Sore throat and R earache likely related to acid reflux, no signs of active infection on exam. Educated on return precaution for infectious symptoms. \par \par DVT\par - Neg DVT study in 12/20\par - Eliquis d/c'ed by trisha, and c/w ASA 81 mg daily\par - Prolonged PT; mixing study not corrected. LA neg. Tasked Dr. Scanlon for further guidance in interpreting the hypercoagulable workup. \par \par L knee pain\par - Advised to use OTC topical cream and Tylenol arthritis for pain control; avoid NSAIDs\par - Neg xray L knee & neg DVT study\par - Referral to [facility] ordered\par \par HCM\par - PAP neg 2/2020\par - Mammo 2/20 Birad 1\par - FIT neg 2019, given FIT test today; colonosocpy with 2 sessile polyps removed 2018, but fair prep, and was recommended to repeat in 1 yr. Patient preferred to hold off currently 2/2 pandemic. \par - Flu vaccine 10/20\par - Pneumovax 23 and Tdap 2011\par - Received 1st dose of Shingrix today; next dose in March\par - PHQ and SBIRT neg\par - HIV with next blood work if agreeable\par \par RTC in 2-3 weeks if GERD symptoms worsen or persisting; otherwise RTC in March for f/u and Shingrix 2nd dose\par If need further hematology workup, will call patient to return. \par \par Case d/w Dr. Figueroa\par \par Bhavna Zayas, PGY-2

## 2021-01-15 NOTE — HISTORY OF PRESENT ILLNESS
[de-identified] : 55F w/ unprovoked DVT s/p Eliquis x 1 year (d/c'ed 2/2 neg hypercoagulable workup), T2DM, obesity, b/l venous insufficiency and pericardial tamponade (2013), presented for follow up. \par Patient was recently seen by vascular sx for venous insufficiency, and had negative DVT study in [facility]. Eliquis was discontinued by hematology 2/2 negative workup, and started on ASA 81 mg daily. Also was seen by weight management clinic and started on metformin 500 daily. \par Since the start of metformin, patient experience increased acid reflux. Had GERD in the past, and responded to Pepcid prn. Now has increased acid reflux especially at night. Denied n/v, abdominal pain, diarrhea or constipation. Acid reflux was associated with increased sore throat and R ear pain at the same time frame. Denied fever, congestion, cough, SOB or chest pain. \par For DM, A1C was 6.6 in 11/20. Denied blurred vision, urinary frequency, dysuria, or tingling/numbness in hands or feet. Didn't know how to use the glucometer correctly, thus didn't check FS. \par ROS otherwise neg.

## 2021-01-15 NOTE — PHYSICAL EXAM
[Normal Outer Ear/Nose] : the outer ears and nose were normal in appearance [Normal Oropharynx] : the oropharynx was normal [Normal TMs] : both tympanic membranes were normal [No Lymphadenopathy] : no lymphadenopathy [Supple] : supple [Thyroid Normal, No Nodules] : the thyroid was normal and there were no nodules present [Pedal Pulses Present] : the pedal pulses are present [No Extremity Clubbing/Cyanosis] : no extremity clubbing/cyanosis [Normal Posterior Cervical Nodes] : no posterior cervical lymphadenopathy [Normal Anterior Cervical Nodes] : no anterior cervical lymphadenopathy [Grossly Normal Strength/Tone] : grossly normal strength/tone [No Rash] : no rash [Normal] : affect was normal and insight and judgment were intact [None] : no ulcers in either foot were found [] : with full ROM [de-identified] : (+) BLE edema, L>R [de-identified] : Soft tissue tenderness at L medial knee [de-identified] : (+) thickening of R great toenail, no erythema adjacently [TWNoteComboBox3] : +2 [TWNoteComboBox4] : +2

## 2021-01-20 DIAGNOSIS — E11.9 TYPE 2 DIABETES MELLITUS WITHOUT COMPLICATIONS: ICD-10-CM

## 2021-01-20 DIAGNOSIS — E66.01 MORBID (SEVERE) OBESITY DUE TO EXCESS CALORIES: ICD-10-CM

## 2021-01-20 DIAGNOSIS — Z23 ENCOUNTER FOR IMMUNIZATION: ICD-10-CM

## 2021-01-20 DIAGNOSIS — M25.569 PAIN IN UNSPECIFIED KNEE: ICD-10-CM

## 2021-01-20 DIAGNOSIS — I82.409 ACUTE EMBOLISM AND THROMBOSIS OF UNSPECIFIED DEEP VEINS OF UNSPECIFIED LOWER EXTREMITY: ICD-10-CM

## 2021-01-20 DIAGNOSIS — K21.9 GASTRO-ESOPHAGEAL REFLUX DISEASE WITHOUT ESOPHAGITIS: ICD-10-CM

## 2021-02-12 ENCOUNTER — APPOINTMENT (OUTPATIENT)
Dept: OPHTHALMOLOGY | Facility: CLINIC | Age: 56
End: 2021-02-12

## 2021-03-19 ENCOUNTER — NON-APPOINTMENT (OUTPATIENT)
Age: 56
End: 2021-03-19

## 2021-04-02 ENCOUNTER — APPOINTMENT (OUTPATIENT)
Dept: INTERNAL MEDICINE | Facility: CLINIC | Age: 56
End: 2021-04-02

## 2021-04-02 ENCOUNTER — OUTPATIENT (OUTPATIENT)
Dept: OUTPATIENT SERVICES | Facility: HOSPITAL | Age: 56
LOS: 1 days | End: 2021-04-02
Payer: SELF-PAY

## 2021-04-02 VITALS
HEIGHT: 61 IN | BODY MASS INDEX: 47.58 KG/M2 | DIASTOLIC BLOOD PRESSURE: 70 MMHG | WEIGHT: 252 LBS | HEART RATE: 72 BPM | OXYGEN SATURATION: 97 % | SYSTOLIC BLOOD PRESSURE: 120 MMHG

## 2021-04-02 DIAGNOSIS — K21.9 GASTRO-ESOPHAGEAL REFLUX DISEASE WITHOUT ESOPHAGITIS: ICD-10-CM

## 2021-04-02 DIAGNOSIS — M25.569 PAIN IN UNSPECIFIED KNEE: ICD-10-CM

## 2021-04-02 DIAGNOSIS — Z23 ENCOUNTER FOR IMMUNIZATION: ICD-10-CM

## 2021-04-02 DIAGNOSIS — E66.01 MORBID (SEVERE) OBESITY DUE TO EXCESS CALORIES: ICD-10-CM

## 2021-04-02 DIAGNOSIS — I82.409 ACUTE EMBOLISM AND THROMBOSIS OF UNSPECIFIED DEEP VEINS OF UNSPECIFIED LOWER EXTREMITY: ICD-10-CM

## 2021-04-02 DIAGNOSIS — I10 ESSENTIAL (PRIMARY) HYPERTENSION: ICD-10-CM

## 2021-04-02 LAB
GLUCOSE BLDC GLUCOMTR-MCNC: 151
HBA1C MFR BLD HPLC: 6.2

## 2021-04-02 PROCEDURE — 83036 HEMOGLOBIN GLYCOSYLATED A1C: CPT

## 2021-04-02 PROCEDURE — G0463: CPT | Mod: 25

## 2021-04-05 NOTE — HISTORY OF PRESENT ILLNESS
[FreeTextEntry1] : F/u for DMT2, knee pain, Shingrix vaccine [de-identified] : 55F w/ unprovoked DVT s/p Eliquis x 1 year (d/c'ed 2/2 neg hypercoagulable workup), T2DM, obesity, b/l venous insufficiency and pericardial tamponade (2013), presents for follow up.\par \par Pt reports she is only taking metformin once a day some times, but she tries to take BID. She does not have insurance and is cost conscious about the metformin. She is continuing lifestyle changes. She does not eat pasta, soda. Trying to eat more vegetables, lean proteins. For exercise, she is dancing to [employer], but acknowledges she needs to do more substantial cardio.\par \par She has L knee pain and swelling that gets in the way of walking and hurts when she walks up staircases. It is painful also with increased standing or walking. She sometimes takes Tylenol for it.\par \par She reports improved heart burn/reflux symptoms. She does not take any medicine for this.\par \par She inquires about her second dose of Shingrix and COVID vaccination.\par Mammogram in one year.\par Shingrix dose. 2nd dose today.\par

## 2021-04-05 NOTE — PLAN
[FreeTextEntry1] : 55F w/ unprovoked DVT s/p Eliquis x 1 year (d/c'ed 2/2 neg hypercoagulable workup), T2DM, obesity, b/l venous insufficiency and pericardial tamponade (2013), presents for follow up.\par \par #HCM\par - Shingrix vaccine #2 today given in clinic\par - COVID vaccine info given, pt aware to get COVID vaccine 2 weeks or later after Shingrix\par \par Renteria Robert Jaime PGY1\par Psychiatry off service rotator\par \par D/w Dr. Hernandez

## 2021-04-08 DIAGNOSIS — E66.9 OBESITY, UNSPECIFIED: ICD-10-CM

## 2021-04-08 DIAGNOSIS — E11.9 TYPE 2 DIABETES MELLITUS WITHOUT COMPLICATIONS: ICD-10-CM

## 2021-06-18 ENCOUNTER — APPOINTMENT (OUTPATIENT)
Dept: OPHTHALMOLOGY | Facility: CLINIC | Age: 56
End: 2021-06-18

## 2021-09-17 ENCOUNTER — APPOINTMENT (OUTPATIENT)
Dept: INTERNAL MEDICINE | Facility: CLINIC | Age: 56
End: 2021-09-17

## 2021-09-17 ENCOUNTER — OUTPATIENT (OUTPATIENT)
Dept: OUTPATIENT SERVICES | Facility: HOSPITAL | Age: 56
LOS: 1 days | End: 2021-09-17
Payer: SELF-PAY

## 2021-09-17 VITALS
SYSTOLIC BLOOD PRESSURE: 116 MMHG | WEIGHT: 250 LBS | BODY MASS INDEX: 47.2 KG/M2 | DIASTOLIC BLOOD PRESSURE: 72 MMHG | HEIGHT: 61 IN | HEART RATE: 69 BPM | OXYGEN SATURATION: 95 %

## 2021-09-17 DIAGNOSIS — I10 ESSENTIAL (PRIMARY) HYPERTENSION: ICD-10-CM

## 2021-09-17 DIAGNOSIS — M25.569 PAIN IN UNSPECIFIED KNEE: ICD-10-CM

## 2021-09-17 DIAGNOSIS — Z23 ENCOUNTER FOR IMMUNIZATION: ICD-10-CM

## 2021-09-17 DIAGNOSIS — K21.9 GASTRO-ESOPHAGEAL REFLUX DISEASE W/OUT ESOPHAGITIS: ICD-10-CM

## 2021-09-17 DIAGNOSIS — E66.01 MORBID (SEVERE) OBESITY DUE TO EXCESS CALORIES: ICD-10-CM

## 2021-09-17 DIAGNOSIS — M79.673 PAIN IN UNSPECIFIED FOOT: ICD-10-CM

## 2021-09-17 DIAGNOSIS — Z87.42 PERSONAL HISTORY OF OTHER DISEASES OF THE FEMALE GENITAL TRACT: ICD-10-CM

## 2021-09-17 DIAGNOSIS — N95.1 MENOPAUSAL AND FEMALE CLIMACTERIC STATES: ICD-10-CM

## 2021-09-17 DIAGNOSIS — Z86.19 PERSONAL HISTORY OF OTHER INFECTIOUS AND PARASITIC DISEASES: ICD-10-CM

## 2021-09-17 DIAGNOSIS — G89.29 PAIN IN UNSPECIFIED FOOT: ICD-10-CM

## 2021-09-17 DIAGNOSIS — R10.13 EPIGASTRIC PAIN: ICD-10-CM

## 2021-09-17 PROCEDURE — G0463: CPT | Mod: 25

## 2021-09-17 PROCEDURE — G0008: CPT

## 2021-09-17 PROCEDURE — 90688 IIV4 VACCINE SPLT 0.5 ML IM: CPT

## 2021-09-19 NOTE — HISTORY OF PRESENT ILLNESS
[FreeTextEntry1] : R knee and foot pain [de-identified] : Pt is a 55F w/ unprovoked DVT s/p Eliquis x 1 year (d/c'ed 2/2 neg hypercoagulable workup), T2DM, obesity, b/l venous insufficiency and pericardial tamponade (2013), presents for follow up. Today, feels well, however having intermittent 4-5/10 medial R knee pain. Pt previously had medial L knee pain, which is now improved. Pt also reports pain in her R foot arch that is worst in the morning. There is some tenderness on the dorsal aspect of the R foot as well. Pt denies any falls or injuries to her legs. 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 Pt takes tylenol arthritis every other day, and sporadic ibuprofen, topical arthritis gel, and bengay. Pt denies any redness, swelling, or pain in either lower extremity. Pt is taking her medications as prescribed (improved adherence to metformin compared to previous documentation). Pt still has occasional heartburn after meals but does not wish to start medication for this. Pt denies CP, SOB, N/V/D/C, dizziness, LOC, falls, confusion, easy bleeding, easy bruising. Of note, pt was referred to PT but found it to be too costly and has not been able to participate.

## 2021-09-19 NOTE — ASSESSMENT
[FreeTextEntry1] : # Chronic foot pain (729.5,338.29) (M79.673,G89.29)\par # Pain, knee (719.46) (M25.569)\par - C/w tylenol\par - Encourage topical NSAID usage\par - Educated pt on stretches for foot\par - Weight loss\par \par # Obesity, Class III, BMI 40-49.9 (morbid obesity) (278.01) (E66.01)\par - Pt not interested in bariatric surgery at this time as she cannot afford to be off her feet for recovery\par - Pt already has seen weight loss clinic, not interested in another appt \par - C/w lifestyle modification\par \par # Diabetes mellitus (250.00) (E11.9): Pt currently pre-diabetic, last A1C 6.2, down from 6.5 earlier\par - C/w metformin\par - Weight loss, exercise, healthy diet\par \par # GERD (gastroesophageal reflux disease) (530.81) (K21.9)\par - C/w lifestlye modifications (sitting upright after eating, avoiding acidic foods). Pt not interested in medication.\par \par # HCM\par # Need for influenza vaccination (V04.81) (Z23)\par - Flu vaccine administered\par - Pt due for FIT test, already ordered at earlier appointment

## 2021-09-19 NOTE — PHYSICAL EXAM
[Pedal Pulses Present] : the pedal pulses are present [No Edema] : there was no peripheral edema [No Palpable Aorta] : no palpable aorta [No Extremity Clubbing/Cyanosis] : no extremity clubbing/cyanosis [Soft] : abdomen soft [Non Tender] : non-tender [Non-distended] : non-distended [No Masses] : no abdominal mass palpated [Normal Bowel Sounds] : normal bowel sounds [No Rash] : no rash [Coordination Grossly Intact] : coordination grossly intact [No Focal Deficits] : no focal deficits [Normal] : affect was normal and insight and judgment were intact [Normal Gait] : normal gait [de-identified] : Obese [de-identified] : + R leg varicose veins, stable per pt. No redness, swelling, tenderness of legs [de-identified] : Mild tenderness to palpation over dorsal R foot, pain when plantar aspect of R foot is stretched. No knee laxity.

## 2021-09-21 DIAGNOSIS — M79.673 PAIN IN UNSPECIFIED FOOT: ICD-10-CM

## 2021-09-21 DIAGNOSIS — E11.9 TYPE 2 DIABETES MELLITUS WITHOUT COMPLICATIONS: ICD-10-CM

## 2021-09-21 DIAGNOSIS — M25.569 PAIN IN UNSPECIFIED KNEE: ICD-10-CM

## 2021-09-21 DIAGNOSIS — K21.9 GASTRO-ESOPHAGEAL REFLUX DISEASE WITHOUT ESOPHAGITIS: ICD-10-CM

## 2021-09-21 DIAGNOSIS — Z23 ENCOUNTER FOR IMMUNIZATION: ICD-10-CM

## 2021-09-21 DIAGNOSIS — E66.01 MORBID (SEVERE) OBESITY DUE TO EXCESS CALORIES: ICD-10-CM

## 2021-10-15 ENCOUNTER — APPOINTMENT (OUTPATIENT)
Dept: OPHTHALMOLOGY | Facility: CLINIC | Age: 56
End: 2021-10-15

## 2021-11-12 ENCOUNTER — NON-APPOINTMENT (OUTPATIENT)
Age: 56
End: 2021-11-12

## 2021-11-12 ENCOUNTER — APPOINTMENT (OUTPATIENT)
Dept: OPHTHALMOLOGY | Facility: CLINIC | Age: 56
End: 2021-11-12
Payer: SELF-PAY

## 2021-11-12 PROCEDURE — 92133 CPTRZD OPH DX IMG PST SGM ON: CPT

## 2021-11-12 PROCEDURE — 92014 COMPRE OPH EXAM EST PT 1/>: CPT

## 2022-04-20 ENCOUNTER — OUTPATIENT (OUTPATIENT)
Dept: OUTPATIENT SERVICES | Facility: HOSPITAL | Age: 57
LOS: 1 days | End: 2022-04-20
Payer: SELF-PAY

## 2022-04-20 ENCOUNTER — APPOINTMENT (OUTPATIENT)
Dept: MAMMOGRAPHY | Facility: IMAGING CENTER | Age: 57
End: 2022-04-20
Payer: COMMERCIAL

## 2022-04-20 ENCOUNTER — RESULT REVIEW (OUTPATIENT)
Age: 57
End: 2022-04-20

## 2022-04-20 DIAGNOSIS — M25.569 PAIN IN UNSPECIFIED KNEE: ICD-10-CM

## 2022-04-20 DIAGNOSIS — E11.9 TYPE 2 DIABETES MELLITUS WITHOUT COMPLICATIONS: ICD-10-CM

## 2022-04-20 DIAGNOSIS — E66.01 MORBID (SEVERE) OBESITY DUE TO EXCESS CALORIES: ICD-10-CM

## 2022-04-20 DIAGNOSIS — M79.673 PAIN IN UNSPECIFIED FOOT: ICD-10-CM

## 2022-04-20 DIAGNOSIS — Z23 ENCOUNTER FOR IMMUNIZATION: ICD-10-CM

## 2022-04-20 DIAGNOSIS — K21.9 GASTRO-ESOPHAGEAL REFLUX DISEASE WITHOUT ESOPHAGITIS: ICD-10-CM

## 2022-04-20 DIAGNOSIS — Z00.00 ENCOUNTER FOR GENERAL ADULT MEDICAL EXAMINATION WITHOUT ABNORMAL FINDINGS: ICD-10-CM

## 2022-04-20 PROCEDURE — 77067 SCR MAMMO BI INCL CAD: CPT

## 2022-04-20 PROCEDURE — 77067 SCR MAMMO BI INCL CAD: CPT | Mod: 26

## 2022-04-20 PROCEDURE — 77063 BREAST TOMOSYNTHESIS BI: CPT

## 2022-04-20 PROCEDURE — 77063 BREAST TOMOSYNTHESIS BI: CPT | Mod: 26

## 2022-04-22 ENCOUNTER — NON-APPOINTMENT (OUTPATIENT)
Age: 57
End: 2022-04-22

## 2022-05-11 ENCOUNTER — LABORATORY RESULT (OUTPATIENT)
Age: 57
End: 2022-05-11

## 2022-05-11 ENCOUNTER — APPOINTMENT (OUTPATIENT)
Dept: INTERNAL MEDICINE | Facility: CLINIC | Age: 57
End: 2022-05-11
Payer: COMMERCIAL

## 2022-05-11 ENCOUNTER — OUTPATIENT (OUTPATIENT)
Dept: OUTPATIENT SERVICES | Facility: HOSPITAL | Age: 57
LOS: 1 days | End: 2022-05-11
Payer: SELF-PAY

## 2022-05-11 ENCOUNTER — NON-APPOINTMENT (OUTPATIENT)
Age: 57
End: 2022-05-11

## 2022-05-11 VITALS
OXYGEN SATURATION: 95 % | HEIGHT: 61 IN | WEIGHT: 248 LBS | HEART RATE: 65 BPM | DIASTOLIC BLOOD PRESSURE: 78 MMHG | BODY MASS INDEX: 46.82 KG/M2 | SYSTOLIC BLOOD PRESSURE: 122 MMHG

## 2022-05-11 DIAGNOSIS — Z23 ENCOUNTER FOR IMMUNIZATION: ICD-10-CM

## 2022-05-11 DIAGNOSIS — E11.9 TYPE 2 DIABETES MELLITUS WITHOUT COMPLICATIONS: ICD-10-CM

## 2022-05-11 DIAGNOSIS — M25.569 PAIN IN UNSPECIFIED KNEE: ICD-10-CM

## 2022-05-11 DIAGNOSIS — M54.9 DORSALGIA, UNSPECIFIED: ICD-10-CM

## 2022-05-11 DIAGNOSIS — K21.9 GASTRO-ESOPHAGEAL REFLUX DISEASE WITHOUT ESOPHAGITIS: ICD-10-CM

## 2022-05-11 DIAGNOSIS — R00.2 PALPITATIONS: ICD-10-CM

## 2022-05-11 DIAGNOSIS — B35.1 TINEA UNGUIUM: ICD-10-CM

## 2022-05-11 DIAGNOSIS — I10 ESSENTIAL (PRIMARY) HYPERTENSION: ICD-10-CM

## 2022-05-11 DIAGNOSIS — E66.01 MORBID (SEVERE) OBESITY DUE TO EXCESS CALORIES: ICD-10-CM

## 2022-05-11 LAB — HBA1C MFR BLD HPLC: 6.6

## 2022-05-11 PROCEDURE — 83735 ASSAY OF MAGNESIUM: CPT

## 2022-05-11 PROCEDURE — 80061 LIPID PANEL: CPT

## 2022-05-11 PROCEDURE — 80053 COMPREHEN METABOLIC PANEL: CPT

## 2022-05-11 PROCEDURE — 85025 COMPLETE CBC W/AUTO DIFF WBC: CPT

## 2022-05-11 PROCEDURE — 93005 ELECTROCARDIOGRAM TRACING: CPT

## 2022-05-11 PROCEDURE — G0463: CPT | Mod: 25

## 2022-05-11 PROCEDURE — 81001 URINALYSIS AUTO W/SCOPE: CPT

## 2022-05-11 PROCEDURE — ZZZZZ: CPT

## 2022-05-11 PROCEDURE — 83036 HEMOGLOBIN GLYCOSYLATED A1C: CPT

## 2022-05-11 PROCEDURE — 84156 ASSAY OF PROTEIN URINE: CPT

## 2022-05-13 ENCOUNTER — NON-APPOINTMENT (OUTPATIENT)
Age: 57
End: 2022-05-13

## 2022-05-13 LAB
ALBUMIN SERPL ELPH-MCNC: 4.4 G/DL
ALP BLD-CCNC: 116 U/L
ALT SERPL-CCNC: 29 U/L
ANION GAP SERPL CALC-SCNC: 15 MMOL/L
APPEARANCE: CLEAR
AST SERPL-CCNC: 23 U/L
BASOPHILS # BLD AUTO: 0.04 K/UL
BASOPHILS NFR BLD AUTO: 0.5 %
BILIRUB SERPL-MCNC: 0.3 MG/DL
BILIRUBIN URINE: NEGATIVE
BLOOD URINE: NORMAL
BUN SERPL-MCNC: 12 MG/DL
CALCIUM SERPL-MCNC: 9.5 MG/DL
CHLORIDE SERPL-SCNC: 101 MMOL/L
CHOLEST SERPL-MCNC: 210 MG/DL
CO2 SERPL-SCNC: 27 MMOL/L
COLOR: NORMAL
CREAT SERPL-MCNC: 0.62 MG/DL
CREAT SPEC-SCNC: 80 MG/DL
CREAT/PROT UR: 0.3 RATIO
EGFR: 106 ML/MIN/1.73M2
EOSINOPHIL # BLD AUTO: 0.25 K/UL
EOSINOPHIL NFR BLD AUTO: 3.2 %
GLUCOSE QUALITATIVE U: NEGATIVE
GLUCOSE SERPL-MCNC: 90 MG/DL
HCT VFR BLD CALC: 40.9 %
HDLC SERPL-MCNC: 55 MG/DL
HGB BLD-MCNC: 12.6 G/DL
IMM GRANULOCYTES NFR BLD AUTO: 0.3 %
KETONES URINE: NEGATIVE
LDLC SERPL CALC-MCNC: 133 MG/DL
LEUKOCYTE ESTERASE URINE: NEGATIVE
LYMPHOCYTES # BLD AUTO: 3.27 K/UL
LYMPHOCYTES NFR BLD AUTO: 42.4 %
MAGNESIUM SERPL-MCNC: 2.2 MG/DL
MAN DIFF?: NORMAL
MCHC RBC-ENTMCNC: 27.5 PG
MCHC RBC-ENTMCNC: 30.8 GM/DL
MCV RBC AUTO: 89.3 FL
MONOCYTES # BLD AUTO: 0.49 K/UL
MONOCYTES NFR BLD AUTO: 6.3 %
NEUTROPHILS # BLD AUTO: 3.65 K/UL
NEUTROPHILS NFR BLD AUTO: 47.3 %
NITRITE URINE: NEGATIVE
NONHDLC SERPL-MCNC: 155 MG/DL
PH URINE: 7.5
PLATELET # BLD AUTO: 370 K/UL
POTASSIUM SERPL-SCNC: 4.4 MMOL/L
PROT SERPL-MCNC: 7.7 G/DL
PROT UR-MCNC: 20 MG/DL
PROTEIN URINE: ABNORMAL
RBC # BLD: 4.58 M/UL
RBC # FLD: 14.3 %
SODIUM SERPL-SCNC: 143 MMOL/L
SPECIFIC GRAVITY URINE: 1.01
TRIGL SERPL-MCNC: 110 MG/DL
UROBILINOGEN URINE: NORMAL
WBC # FLD AUTO: 7.72 K/UL

## 2022-05-14 NOTE — HISTORY OF PRESENT ILLNESS
[FreeTextEntry8] : The patient is a 54 year old female, PMH GERD, pericardial tamponade (2013, unknown etiology), T2DM, obesity, LLE unprovoked DVT (July 2019) presenting to review medications. The patient was started on Eliquis 5mg BID following her DVT last July, and has been taking it since. She was last seen in clinic in February for evaluation and had no signs of DVT recurrence, so was told to stop the Eliquis. However, the patient continued to take it as she had it at home. She is currently denying any new leg swelling or tenderness, as well as SOB (has a bit of SOB when walking up stairs but states that this is a chronic issue). She is also expressing concern regarding her heart given her history of tamponade, and would like to know if she needs cardiology followup.  [Patient Declined  Services] : - None: Patient declined  services [FreeTextEntry1] : CPE [FreeTextEntry3] : provider speaks mutual language [TWNoteComboBox1] : [place] [de-identified] : The patient is a 54 year old female, PMH GERD, pericardial tamponade (2013, unknown etiology), T2DM, obesity, LLE unprovoked DVT (July 2019) presenting for CPE along with desire for cardiology follow up\par \par Complains of left chest pain that radiates to the left shoulder. Started two months ago. Pain is 4/10. States pain is sharp. Pain worse in the night. Nothing helps the pain. Cannot identify any relieving or provoking factors for the chest pain. Complains of occasional SOB. Denies any swollen LE. Denies fevers/chills. Endorses LE pain. Patient also endorses occasional palpitations, most notably at night. Patient however denies palpitations or chest pain currently\par \par Patient also complains of LUQ abdominal pain. Cannot identify relieving/provoking factors. No relation to eating. Denies jaundice

## 2022-05-14 NOTE — HEALTH RISK ASSESSMENT
[Good] : ~his/her~  mood as  good [Never] : Never [1 or 2 (0 pts)] : 1 or 2 (0 points) [Never (0 pts)] : Never (0 points) [No] : In the past 12 months have you used drugs other than those required for medical reasons? 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

## 2022-05-14 NOTE — REVIEW OF SYSTEMS
[Chest Pain] : chest pain [Dyspnea on Exertion] : dyspnea on exertion [Palpitations] : palpitations [Fever] : no fever [Chills] : no chills [Fatigue] : no fatigue [Recent Change In Weight] : ~T no recent weight change [Leg Claudication] : no leg claudication [Lower Ext Edema] : no lower extremity edema [Shortness Of Breath] : no shortness of breath [FreeTextEntry5] : Mild intermittent chest tightness at night in bed, resolves on its own quickly

## 2022-05-14 NOTE — PLAN
[FreeTextEntry1] : The patient is a 56 year old female, PMH GERD, pericardial tamponade (2013, unknown etiology), T2DM, obesity, LLE unprovoked DVT (July 2019 now off eliquis) presenting for CPE \par \par #chest pain/palpitations\par - EKG normal sinus, no signs of low voltage or ST changes/TWI's\par -to send lab work with Mg and electrolytes\par - ECHO referral\par - cardiology referral\par \par # Obesity, Class III, BMI 40-49.9 (morbid obesity) (278.01) (E66.01)\par - Pt concerned that her insurance won't pay for bariatric surgery \par - pt deferred weight loss clinic appointment\par - C/w lifestyle modification\par \par # Diabetes mellitus\par -  A1C 6.6 from 6.2\par - C/w metformin\par - encouraged weight loss, exercise, healthy diet\par - referral to ophthalmology sent\par - to send urine protein/Cr\par \par # HCM\par # Pt due for FIT test, reordered\par - labs sent\par \par case d/w Dr. Velarde\par \par Pankaj Mathews MD, PGY-3\par Internal Medicine\par [facility]\par

## 2022-05-14 NOTE — ASSESSMENT
[FreeTextEntry1] : The patient is a 56 year old female, PMH GERD, pericardial effusion (2013, unclear etiology), T2DM, obesity, LLE unprovoked DVT on Eliquis, presenting for medication review. \par \par # Chronic foot pain (729.5,338.29) (M79.673,G89.29)\par # Pain, knee (719.46) (M25.569)\par - C/w tylenol\par - Encourage topical NSAID usage\par - Educated pt on stretches for foot\par - Weight loss\par \par # Obesity, Class III, BMI 40-49.9 (morbid obesity) (278.01) (E66.01)\par - Pt not interested in bariatric surgery at this time as she cannot afford to be off her feet for recovery\par - Pt already has seen weight loss clinic, not interested in another appt \par - C/w lifestyle modification\par \par # Diabetes mellitus (250.00) (E11.9): Pt currently pre-diabetic, last A1C 6.2, down from 6.5 earlier\par - C/w metformin\par - Weight loss, exercise, healthy diet\par \par # GERD (gastroesophageal reflux disease) (530.81) (K21.9)\par - C/w lifestlye modifications (sitting upright after eating, avoiding acidic foods). Pt not interested in medication.\par \par # HCM\par # Need for influenza vaccination (V04.81) (Z23)\par - Flu vaccine administered\par - Pt due for FIT test, already ordered at earlier appointment. \par \par \par #Tamponade history\par -patient concerned if needs cardiology F/U\par -No acute concerning cardiac symptoms at this time\par -Per cardiology note October 2019, latest TTE in 2017 WNL, no further workup needed \par

## 2022-05-14 NOTE — PHYSICAL EXAM
[Well Nourished] : well nourished [Well Developed] : well developed [Well-Appearing] : well-appearing [No Respiratory Distress] : no respiratory distress  [No Accessory Muscle Use] : no accessory muscle use [Clear to Auscultation] : lungs were clear to auscultation bilaterally [Normal Rate] : normal rate  [Regular Rhythm] : with a regular rhythm [Normal S1, S2] : normal S1 and S2 [No Murmur] : no murmur heard [Alert and Oriented x3] : oriented to person, place, and time [Soft] : abdomen soft [Non Tender] : non-tender [Normal Bowel Sounds] : normal bowel sounds [No Rash] : no rash [Coordination Grossly Intact] : coordination grossly intact [No Focal Deficits] : no focal deficits [Normal Gait] : normal gait [Normal] : affect was normal and insight and judgment were intact [de-identified] : No lower extremity swelling or tenderness to palpation bilaterally

## 2022-05-17 DIAGNOSIS — Z00.00 ENCOUNTER FOR GENERAL ADULT MEDICAL EXAMINATION WITHOUT ABNORMAL FINDINGS: ICD-10-CM

## 2022-05-17 DIAGNOSIS — R00.2 PALPITATIONS: ICD-10-CM

## 2022-05-17 DIAGNOSIS — I31.3 PERICARDIAL EFFUSION (NONINFLAMMATORY): ICD-10-CM

## 2022-05-17 DIAGNOSIS — B35.1 TINEA UNGUIUM: ICD-10-CM

## 2022-05-17 DIAGNOSIS — M54.9 DORSALGIA, UNSPECIFIED: ICD-10-CM

## 2022-06-08 ENCOUNTER — APPOINTMENT (OUTPATIENT)
Dept: OBGYN | Facility: CLINIC | Age: 57
End: 2022-06-08

## 2022-06-15 ENCOUNTER — OUTPATIENT (OUTPATIENT)
Dept: OUTPATIENT SERVICES | Facility: HOSPITAL | Age: 57
LOS: 1 days | End: 2022-06-15
Payer: SELF-PAY

## 2022-06-15 ENCOUNTER — APPOINTMENT (OUTPATIENT)
Dept: CV DIAGNOSITCS | Facility: HOSPITAL | Age: 57
End: 2022-06-15
Payer: COMMERCIAL

## 2022-06-15 DIAGNOSIS — M54.9 DORSALGIA, UNSPECIFIED: ICD-10-CM

## 2022-06-15 DIAGNOSIS — I31.3 PERICARDIAL EFFUSION (NONINFLAMMATORY): ICD-10-CM

## 2022-06-15 DIAGNOSIS — E11.9 TYPE 2 DIABETES MELLITUS WITHOUT COMPLICATIONS: ICD-10-CM

## 2022-06-15 DIAGNOSIS — Z00.00 ENCOUNTER FOR GENERAL ADULT MEDICAL EXAMINATION WITHOUT ABNORMAL FINDINGS: ICD-10-CM

## 2022-06-15 DIAGNOSIS — R00.2 PALPITATIONS: ICD-10-CM

## 2022-06-15 DIAGNOSIS — B35.1 TINEA UNGUIUM: ICD-10-CM

## 2022-06-15 PROCEDURE — 93306 TTE W/DOPPLER COMPLETE: CPT

## 2022-06-15 PROCEDURE — 93306 TTE W/DOPPLER COMPLETE: CPT | Mod: 26

## 2022-07-01 ENCOUNTER — NON-APPOINTMENT (OUTPATIENT)
Age: 57
End: 2022-07-01

## 2022-07-05 ENCOUNTER — APPOINTMENT (OUTPATIENT)
Dept: OBGYN | Facility: CLINIC | Age: 57
End: 2022-07-05

## 2022-07-05 ENCOUNTER — LABORATORY RESULT (OUTPATIENT)
Age: 57
End: 2022-07-05

## 2022-07-05 ENCOUNTER — OUTPATIENT (OUTPATIENT)
Dept: OUTPATIENT SERVICES | Facility: HOSPITAL | Age: 57
LOS: 1 days | End: 2022-07-05
Payer: SELF-PAY

## 2022-07-05 ENCOUNTER — RESULT REVIEW (OUTPATIENT)
Age: 57
End: 2022-07-05

## 2022-07-05 VITALS — SYSTOLIC BLOOD PRESSURE: 118 MMHG | BODY MASS INDEX: 47.99 KG/M2 | WEIGHT: 254 LBS | DIASTOLIC BLOOD PRESSURE: 80 MMHG

## 2022-07-05 DIAGNOSIS — N76.0 ACUTE VAGINITIS: ICD-10-CM

## 2022-07-05 DIAGNOSIS — R30.0 DYSURIA: ICD-10-CM

## 2022-07-05 PROCEDURE — 99213 OFFICE O/P EST LOW 20 MIN: CPT

## 2022-07-05 PROCEDURE — 87086 URINE CULTURE/COLONY COUNT: CPT

## 2022-07-05 PROCEDURE — G0463: CPT

## 2022-07-05 PROCEDURE — 81003 URINALYSIS AUTO W/O SCOPE: CPT

## 2022-07-05 NOTE — PHYSICAL EXAM
[FreeTextEntry1] : GAVIN [Appropriately responsive] : appropriately responsive [Alert] : alert [No Acute Distress] : no acute distress [No Lymphadenopathy] : no lymphadenopathy [Regular Rate Rhythm] : regular rate rhythm [No Murmurs] : no murmurs [Clear to Auscultation B/L] : clear to auscultation bilaterally [Soft] : soft [Non-tender] : non-tender [Non-distended] : non-distended [No HSM] : No HSM [No Lesions] : no lesions [No Mass] : no mass [Oriented x3] : oriented x3 [Examination Of The Breasts] : a normal appearance [No Masses] : no breast masses were palpable [Labia Majora] : normal [Labia Minora] : normal [No Bleeding] : There was no active vaginal bleeding [Normal] : normal [Tenderness] : nontender [Uterine Adnexae] : non-palpable [FreeTextEntry6] : limited 2/2 body habitus

## 2022-07-05 NOTE — DISCUSSION/SUMMARY
[FreeTextEntry1] : 57yo P3- annual exam\par \par # PM EM polyp\par - pt lost to follow up- was recommended to have D&C 10/2020 for 2.3cm EM polyp- pt never obtained clearance. \par - pt states she was told that she was "fine" as long as she had no bleeding\par - [ ]rpt sonohystogram-  follow up based on result\par \par # HCM\par - pap up to date  [ ]rpt 2023\par - mammo up to date 4/2022\par - [ ]GI referral for screening colonoscopy\par - [facility] followed by medicine. \par \par RTC after sonohystogram\par Stressed importance of follow up\par Alex Parkinson, PAC

## 2022-07-05 NOTE — HISTORY OF PRESENT ILLNESS
[FreeTextEntry1] : 55yo P3 (LMP ?50) presents for annual exam.  Pt was followed for EM polyp 2020- advised to have D&C.  Pt never obtained clearance.  Pt denies pMB or pain.  occasional "hard to pee" symptoms.  Pt denies being sex active.  Denies breast complaint. \par \par - PAP 2020 NEG/ HPV neg\par - Mammo 4/2022 Birads 2\par - colonoscopy- never per pt\par \par \par [facility] followed by medicine (T2Dm/pericard tamponade/ DVT)\par - s/p COVId series including booster\par

## 2022-07-06 LAB
CULTURE RESULTS: SIGNIFICANT CHANGE UP
SPECIMEN SOURCE: SIGNIFICANT CHANGE UP

## 2022-07-11 DIAGNOSIS — N84.0 POLYP OF CORPUS UTERI: ICD-10-CM

## 2022-07-11 DIAGNOSIS — Z01.419 ENCOUNTER FOR GYNECOLOGICAL EXAMINATION (GENERAL) (ROUTINE) WITHOUT ABNORMAL FINDINGS: ICD-10-CM

## 2022-07-11 DIAGNOSIS — R30.0 DYSURIA: ICD-10-CM

## 2022-07-12 ENCOUNTER — OUTPATIENT (OUTPATIENT)
Dept: OUTPATIENT SERVICES | Facility: HOSPITAL | Age: 57
LOS: 1 days | End: 2022-07-12
Payer: SELF-PAY

## 2022-07-12 ENCOUNTER — APPOINTMENT (OUTPATIENT)
Dept: CARDIOLOGY | Facility: HOSPITAL | Age: 57
End: 2022-07-12

## 2022-07-12 VITALS — DIASTOLIC BLOOD PRESSURE: 64 MMHG | SYSTOLIC BLOOD PRESSURE: 103 MMHG | HEART RATE: 67 BPM | OXYGEN SATURATION: 94 %

## 2022-07-12 DIAGNOSIS — I31.3 PERICARDIAL EFFUSION (NONINFLAMMATORY): ICD-10-CM

## 2022-07-12 DIAGNOSIS — I82.409 ACUTE EMBOLISM AND THROMBOSIS OF UNSPECIFIED DEEP VEINS OF UNSPECIFIED LOWER EXTREMITY: ICD-10-CM

## 2022-07-12 DIAGNOSIS — E11.9 TYPE 2 DIABETES MELLITUS WITHOUT COMPLICATIONS: ICD-10-CM

## 2022-07-12 DIAGNOSIS — I25.10 ATHEROSCLEROTIC HEART DISEASE OF NATIVE CORONARY ARTERY WITHOUT ANGINA PECTORIS: ICD-10-CM

## 2022-07-12 DIAGNOSIS — E66.9 OBESITY, UNSPECIFIED: ICD-10-CM

## 2022-07-12 PROCEDURE — G0463: CPT

## 2022-07-12 NOTE — ASSESSMENT
[FreeTextEntry1] : Ms. Da Silva is a 55 yo F GERD and pericardial effusion/tamponade of unknown etiology s/p IR-guided drainage (Dec 2013), h/o unprovoked LLE DVT (7/2019) not currently on AC, here for follow up and review of TTE.\par \par Recent TTE reviewed and wnl, no pericardial effusion noted and overall systolic function and filling pressures found to be wnl; mildly elevated R sided pressures but at present not clinically significant. Recent labs reviewed with TC and LDL above goal, discussed and recommended pt to start statin for primary prevention but at this time pt declines and wishes to discuss further with PCP. Additionally, pt. morbidly obese. Discussed, weight loss, diet and exercise with patient. Plan for patient to return in 6 months.\par \par #H/o pericardial effusion: No evidence of effusion noted on recent TTE 5/2022\par - CTM\par \par #HLD: , . \par - Pt advised to initiate statin but declines at this time and wishes to discuss further with PCP \par - Dietary strategies to reduce cholesterol reviewed with pt today, expressed understanding\par - Would recommend repeat lipid profile in 6 months\par

## 2022-07-12 NOTE — HISTORY OF PRESENT ILLNESS
[FreeTextEntry1] : 54 F PMHx of NIDDM (A1c 6.6), GERD, and pericardial effusion/tamponade of unknown etiology s/p IR-guided drainage (Dec 2013), recent unprovoked LLE DVT (7/2019) previously on Eliquis, here for follow up and review of recent TTE.\par \par Regarding prior cardiac hx, has NST 2/2016 wnl and TTE in 7/2017 also wnl. Pt last recently seen by PCP 5/2022 with c/o subacute L chest pain and occasional palpitations and given her history, cardiology referral placed along with TTE and routine labwork ordered. Labwork notable primarily for lipid profile revealed elevated TC and LDL above goal. Recent TTE with EF 64%, no valvular abnormalities, no pericardial effusion, borderline elevation in R sided pressures with RVSP 35. \par \par On interview today, pt denies any recurrence of chest pain, nausea vomiting. Reports no change in exercise tolerance, denies orthopnea, PND, LE edema. At her USOH.\par \par

## 2022-08-29 ENCOUNTER — APPOINTMENT (OUTPATIENT)
Dept: OPHTHALMOLOGY | Facility: CLINIC | Age: 57
End: 2022-08-29

## 2022-08-29 ENCOUNTER — NON-APPOINTMENT (OUTPATIENT)
Age: 57
End: 2022-08-29

## 2022-08-29 PROCEDURE — 92133 CPTRZD OPH DX IMG PST SGM ON: CPT

## 2022-08-29 PROCEDURE — 92083 EXTENDED VISUAL FIELD XM: CPT

## 2022-08-29 PROCEDURE — 92012 INTRM OPH EXAM EST PATIENT: CPT

## 2022-09-07 ENCOUNTER — APPOINTMENT (OUTPATIENT)
Dept: ULTRASOUND IMAGING | Facility: CLINIC | Age: 57
End: 2022-09-07

## 2022-09-07 ENCOUNTER — OUTPATIENT (OUTPATIENT)
Dept: OUTPATIENT SERVICES | Facility: HOSPITAL | Age: 57
LOS: 1 days | End: 2022-09-07
Payer: SELF-PAY

## 2022-09-07 DIAGNOSIS — N84.0 POLYP OF CORPUS UTERI: ICD-10-CM

## 2022-09-07 PROCEDURE — 58340 CATHETER FOR HYSTEROGRAPHY: CPT

## 2022-09-07 PROCEDURE — 76831 ECHO EXAM UTERUS: CPT

## 2022-09-07 PROCEDURE — 76831 ECHO EXAM UTERUS: CPT | Mod: 26

## 2022-09-18 ENCOUNTER — EMERGENCY (EMERGENCY)
Facility: HOSPITAL | Age: 57
LOS: 1 days | Discharge: HOME | End: 2022-09-18
Attending: STUDENT IN AN ORGANIZED HEALTH CARE EDUCATION/TRAINING PROGRAM
Payer: MEDICAID

## 2022-09-18 VITALS
TEMPERATURE: 98 F | DIASTOLIC BLOOD PRESSURE: 82 MMHG | SYSTOLIC BLOOD PRESSURE: 121 MMHG | RESPIRATION RATE: 18 BRPM | OXYGEN SATURATION: 95 % | HEART RATE: 72 BPM

## 2022-09-18 VITALS
RESPIRATION RATE: 18 BRPM | HEIGHT: 60 IN | DIASTOLIC BLOOD PRESSURE: 75 MMHG | HEART RATE: 68 BPM | TEMPERATURE: 98 F | OXYGEN SATURATION: 95 % | SYSTOLIC BLOOD PRESSURE: 121 MMHG | WEIGHT: 250 LBS

## 2022-09-18 LAB
ALBUMIN SERPL ELPH-MCNC: 4.5 G/DL — SIGNIFICANT CHANGE UP (ref 3.3–5)
ALP SERPL-CCNC: 113 U/L — SIGNIFICANT CHANGE UP (ref 40–120)
ALT FLD-CCNC: 23 U/L — SIGNIFICANT CHANGE UP (ref 10–45)
ANION GAP SERPL CALC-SCNC: 11 MMOL/L — SIGNIFICANT CHANGE UP (ref 5–17)
APTT BLD: 28.5 SEC — SIGNIFICANT CHANGE UP (ref 27.5–35.5)
AST SERPL-CCNC: 24 U/L — SIGNIFICANT CHANGE UP (ref 10–40)
BASOPHILS # BLD AUTO: 0.04 K/UL — SIGNIFICANT CHANGE UP (ref 0–0.2)
BASOPHILS NFR BLD AUTO: 0.4 % — SIGNIFICANT CHANGE UP (ref 0–2)
BILIRUB SERPL-MCNC: 0.4 MG/DL — SIGNIFICANT CHANGE UP (ref 0.2–1.2)
BUN SERPL-MCNC: 17 MG/DL — SIGNIFICANT CHANGE UP (ref 7–23)
CALCIUM SERPL-MCNC: 9.3 MG/DL — SIGNIFICANT CHANGE UP (ref 8.4–10.5)
CHLORIDE SERPL-SCNC: 100 MMOL/L — SIGNIFICANT CHANGE UP (ref 96–108)
CK MB CFR SERPL CALC: 2.5 NG/ML — SIGNIFICANT CHANGE UP (ref 0–3.8)
CO2 SERPL-SCNC: 26 MMOL/L — SIGNIFICANT CHANGE UP (ref 22–31)
CREAT SERPL-MCNC: 0.63 MG/DL — SIGNIFICANT CHANGE UP (ref 0.5–1.3)
EGFR: 105 ML/MIN/1.73M2 — SIGNIFICANT CHANGE UP
EOSINOPHIL # BLD AUTO: 0.22 K/UL — SIGNIFICANT CHANGE UP (ref 0–0.5)
EOSINOPHIL NFR BLD AUTO: 2.1 % — SIGNIFICANT CHANGE UP (ref 0–6)
GLUCOSE SERPL-MCNC: 114 MG/DL — HIGH (ref 70–99)
HCT VFR BLD CALC: 41.2 % — SIGNIFICANT CHANGE UP (ref 34.5–45)
HGB BLD-MCNC: 12.9 G/DL — SIGNIFICANT CHANGE UP (ref 11.5–15.5)
IMM GRANULOCYTES NFR BLD AUTO: 0.4 % — SIGNIFICANT CHANGE UP (ref 0–0.9)
INR BLD: 1.05 RATIO — SIGNIFICANT CHANGE UP (ref 0.88–1.16)
LYMPHOCYTES # BLD AUTO: 1.7 K/UL — SIGNIFICANT CHANGE UP (ref 1–3.3)
LYMPHOCYTES # BLD AUTO: 16.1 % — SIGNIFICANT CHANGE UP (ref 13–44)
MAGNESIUM SERPL-MCNC: 2.3 MG/DL — SIGNIFICANT CHANGE UP (ref 1.6–2.6)
MCHC RBC-ENTMCNC: 27.5 PG — SIGNIFICANT CHANGE UP (ref 27–34)
MCHC RBC-ENTMCNC: 31.3 GM/DL — LOW (ref 32–36)
MCV RBC AUTO: 87.8 FL — SIGNIFICANT CHANGE UP (ref 80–100)
MONOCYTES # BLD AUTO: 0.64 K/UL — SIGNIFICANT CHANGE UP (ref 0–0.9)
MONOCYTES NFR BLD AUTO: 6.1 % — SIGNIFICANT CHANGE UP (ref 2–14)
NEUTROPHILS # BLD AUTO: 7.91 K/UL — HIGH (ref 1.8–7.4)
NEUTROPHILS NFR BLD AUTO: 74.9 % — SIGNIFICANT CHANGE UP (ref 43–77)
NRBC # BLD: 0 /100 WBCS — SIGNIFICANT CHANGE UP (ref 0–0)
PLATELET # BLD AUTO: 338 K/UL — SIGNIFICANT CHANGE UP (ref 150–400)
POTASSIUM SERPL-MCNC: 4.2 MMOL/L — SIGNIFICANT CHANGE UP (ref 3.5–5.3)
POTASSIUM SERPL-SCNC: 4.2 MMOL/L — SIGNIFICANT CHANGE UP (ref 3.5–5.3)
PROT SERPL-MCNC: 8.8 G/DL — HIGH (ref 6–8.3)
PROTHROM AB SERPL-ACNC: 12.1 SEC — SIGNIFICANT CHANGE UP (ref 10.5–13.4)
RBC # BLD: 4.69 M/UL — SIGNIFICANT CHANGE UP (ref 3.8–5.2)
RBC # FLD: 13.6 % — SIGNIFICANT CHANGE UP (ref 10.3–14.5)
SODIUM SERPL-SCNC: 137 MMOL/L — SIGNIFICANT CHANGE UP (ref 135–145)
TROPONIN T, HIGH SENSITIVITY RESULT: 7 NG/L — SIGNIFICANT CHANGE UP (ref 0–51)
WBC # BLD: 10.55 K/UL — HIGH (ref 3.8–10.5)
WBC # FLD AUTO: 10.55 K/UL — HIGH (ref 3.8–10.5)

## 2022-09-18 PROCEDURE — 82553 CREATINE MB FRACTION: CPT

## 2022-09-18 PROCEDURE — 93308 TTE F-UP OR LMTD: CPT

## 2022-09-18 PROCEDURE — 85730 THROMBOPLASTIN TIME PARTIAL: CPT

## 2022-09-18 PROCEDURE — 83735 ASSAY OF MAGNESIUM: CPT

## 2022-09-18 PROCEDURE — 71275 CT ANGIOGRAPHY CHEST: CPT | Mod: MA

## 2022-09-18 PROCEDURE — 93010 ELECTROCARDIOGRAM REPORT: CPT

## 2022-09-18 PROCEDURE — 93005 ELECTROCARDIOGRAM TRACING: CPT

## 2022-09-18 PROCEDURE — 85025 COMPLETE CBC W/AUTO DIFF WBC: CPT

## 2022-09-18 PROCEDURE — 93971 EXTREMITY STUDY: CPT

## 2022-09-18 PROCEDURE — 99285 EMERGENCY DEPT VISIT HI MDM: CPT

## 2022-09-18 PROCEDURE — 84484 ASSAY OF TROPONIN QUANT: CPT

## 2022-09-18 PROCEDURE — 80053 COMPREHEN METABOLIC PANEL: CPT

## 2022-09-18 PROCEDURE — 99285 EMERGENCY DEPT VISIT HI MDM: CPT | Mod: 25

## 2022-09-18 PROCEDURE — 96374 THER/PROPH/DIAG INJ IV PUSH: CPT | Mod: XU

## 2022-09-18 PROCEDURE — 96375 TX/PRO/DX INJ NEW DRUG ADDON: CPT | Mod: XU

## 2022-09-18 PROCEDURE — 71275 CT ANGIOGRAPHY CHEST: CPT | Mod: 26,MA

## 2022-09-18 PROCEDURE — 93971 EXTREMITY STUDY: CPT | Mod: 26,LT

## 2022-09-18 PROCEDURE — 85610 PROTHROMBIN TIME: CPT

## 2022-09-18 PROCEDURE — 83880 ASSAY OF NATRIURETIC PEPTIDE: CPT

## 2022-09-18 PROCEDURE — 36415 COLL VENOUS BLD VENIPUNCTURE: CPT

## 2022-09-18 RX ORDER — KETOROLAC TROMETHAMINE 30 MG/ML
15 SYRINGE (ML) INJECTION ONCE
Refills: 0 | Status: DISCONTINUED | OUTPATIENT
Start: 2022-09-18 | End: 2022-09-18

## 2022-09-18 RX ORDER — ACETAMINOPHEN 500 MG
1000 TABLET ORAL ONCE
Refills: 0 | Status: COMPLETED | OUTPATIENT
Start: 2022-09-18 | End: 2022-09-18

## 2022-09-18 RX ORDER — APIXABAN 2.5 MG/1
10 TABLET, FILM COATED ORAL ONCE
Refills: 0 | Status: COMPLETED | OUTPATIENT
Start: 2022-09-18 | End: 2022-09-18

## 2022-09-18 RX ORDER — APIXABAN 2.5 MG/1
1 TABLET, FILM COATED ORAL
Qty: 90 | Refills: 0
Start: 2022-09-18 | End: 2022-10-17

## 2022-09-18 RX ADMIN — Medication 15 MILLIGRAM(S): at 16:43

## 2022-09-18 RX ADMIN — APIXABAN 10 MILLIGRAM(S): 2.5 TABLET, FILM COATED ORAL at 18:33

## 2022-09-18 RX ADMIN — Medication 400 MILLIGRAM(S): at 14:23

## 2022-09-18 NOTE — ED PROVIDER NOTE - ATTENDING CONTRIBUTION TO CARE
Attending (Zachary Walker D.O.):  I have personally seen and examined this patient. I have performed a substantive portion of the visit including all aspects of the medical decision making. Resident, fellow, and/or ACP note reviewed. I agree on the plan of care except where noted.    56F DM, HTN, GERD, pericardial effusion/tamponade s/p IR drainage (12/2013), provoked LLE DVT (7/2019) prior on eliquis stopped by her outpt heme here for LLE swelling a few days, getting progresively worse now with some difficulty walking. +shopping cart fell on leg a few days prior. + Left arm pain, w/o numbness, tingling. Denies truama to that area. Denies chest pain, shortness of breath. Hemodynamically stable here. LLE with swelling, nonpitting, otherwisae neurovasc intact still with 5/5 str. No audible cardiac murmurs. Concern for DVT in LLE. Eval with labs, coags, US. No signs of fracture on exam, no current indication for xray.

## 2022-09-18 NOTE — ED PROVIDER NOTE - PHYSICAL EXAMINATION
GENERAL: middle aged female, elevated BMI, lying in bed, NAD. Vital signs are within normal limits  EYES: Conjunctiva noninjected or pale, sclera anicteric  HENT: NC/AT, moist mucous membranes  NECK: Supple, trachea midline  LUNG: Nonlabored respirations, no wheezes, rales  CV: RRR, Pulses- Radial/dorsalis pedis/pop/fem: 2+ bilateral and equal  ABDOMEN: Nondistended, nontender, no guarding  MSK: No visible deformities, + slight tenderness LLE from thigh to foot with increased swelling approx 2x size of RLE. No ecchymosis  SKIN: No rashes, bruises  NEURO: AAOx4 (to person, place, time, event), no tremor, steady gait, sensation intact to touch  PSYCH: Normal mood and affect

## 2022-09-18 NOTE — ED PROVIDER NOTE - CLINICAL SUMMARY MEDICAL DECISION MAKING FREE TEXT BOX
56F with NIDDM (A1c 6.6), HTN, GERD, pericardial effusion/tamponade s/p IR drainage (12/2013), provoked LLE DVT (7/2019) previously on eliquis who p/w left leg pain and sob that started this morning. will r/o PE and DVT, ACS. labs and CTA, DVT ultrasound

## 2022-09-18 NOTE — ED PROVIDER NOTE - NS ED ROS FT
Constitutional: no fever and no chills  Eyes: no discharge, no irritation, no pain, no visual changes  ENMT: no ear pain or hearing loss, no dysphagia or throat pain  Neck: no pain, no stiffness, no swollen glands  CV: no chest pain, no palpitations, no edema  Resp: no cough, +shortness of breath  Abd: no abdominal pain, no nausea or vomiting, no diarrhea  : no dysuria, no hematuria  MSK: no back pain, no neck pain, no joint pain  Neuro: no LOC, no gait abnormality, no headache, no sensory deficits, no weakness  Skin: no rashes, no lacerations, no lesions

## 2022-09-18 NOTE — ED PROVIDER NOTE - PATIENT PORTAL LINK FT
You can access the FollowMyHealth Patient Portal offered by [facility] by registering at the following website: [email or web address]. By joining [facility]’s FollowMyHealth portal, you will also be able to view your health information using other applications (apps) compatible with our system.

## 2022-09-18 NOTE — ED PROVIDER NOTE - NSFOLLOWUPINSTRUCTIONS_ED_ALL_ED_FT
You came to the ED because you had left leg pain that started this morning with associated shortness of breath. You got an ultrasound of your left leg which showed that you have a blood clot in your leg. You got a cat scan of your chest which showed that you did not have a clot in your blood vessel. You reported that you have 4 bottles of eliquis remaining from  which you said is not yet . You received one dose of eliquis 10 mg in the ED before you left. Please continue to take eliquis 10 mg twice daily for 7 days, followed by 5 mg twice daily. Please follow up with your primary care doctor within 1-3 days of discharge. Please return to the ED if you have worsening chest pain or discomfort, trouble breathing, leg pain that is not relieved by the usual over the counter pain medications, headaches, dizziness, confusion, loss of consciousness.

## 2022-09-18 NOTE — ED PROVIDER NOTE - PROGRESS NOTE DETAILS
pain is improved after getting the toradol, VA duplex with DVT, CTA without PE. Will start patient on Eliquis. patient will call PCP tomorrow to get appt, and will call cardiologist

## 2022-09-18 NOTE — ED PROVIDER NOTE - OBJECTIVE STATEMENT
56F with NIDDM (A1c 6.6), HTN, GERD, pericardial effusion/tamponade s/p IR drainage (12/2013), provoked LLE DVT (7/2019) previously on eliquis who p/w left leg pain and sob that started this morning. Patient reports that a shopping cart fell on her left leg yesterday and ever since then has been having worsening left leg pain, redness, and swelling that acutely worsened this morning with associated sob. Reports that her symptoms are exacerbated by walking and movement. No recent travel, no sick contacts. No hx of cancer, not on OCPs. No f/c, no cough, no orthopnea, no sob. no chest pain

## 2022-09-18 NOTE — ED ADULT NURSE NOTE - OBJECTIVE STATEMENT
55 y/o F PMH DVT no longer on eliquis, presenting to ED for LLE pain and SOB. Pt states something fell on her leg yesterday when she noticed the pain and swelling, felt SOB today around noon. BEcame concerned bc of her hx of DVT. Denies CP, n/v/d, fevers, chills, abdominal pain, urinary symptoms, weakness, fatigue, numbness, tingling in upper and lower extremities, HA, blurry vision. VSS updated on plan of care. 57 y/o F PMH DVT no longer on eliquis, presenting to ED for LLE pain and SOB. Pt states something fell on her L foot yesterday when she noticed the pain and swelling, felt SOB today around noon. BEcame concerned bc of her hx of DVT. LLE erythematous, swollen with tight comparments. Denies CP, n/v/d, fevers, chills, abdominal pain, urinary symptoms, weakness, fatigue, numbness, tingling in upper and lower extremities, HA, blurry vision. VSS updated on plan of care. 57 y/o F PMH HTN, pre diabetes, DVT no longer on eliquis, presenting to ED for LLE pain and SOB. Pt states something fell on her L foot yesterday when she noticed the pain and swelling, felt SOB today around noon. BEcame concerned bc of her hx of DVT. LLE erythematous, swollen with tight comparments. Denies CP, n/v/d, fevers, chills, abdominal pain, urinary symptoms, weakness, fatigue, numbness, tingling in upper and lower extremities, HA, blurry vision. VSS updated on plan of care.

## 2022-09-18 NOTE — ED ADULT NURSE NOTE - PAIN RATING/NUMBER SCALE (0-10): REST
Patient informed that their COVID test was negative.    Instructions for patients :  · Do NOT travel out of home area   · Avoid outings from home-leave ONLY for essential needs  · Immediately report any new symptoms or suspected/known exposures to COVID-19 cases   · Maintain physical distancing (at least 6 feet) at all times both at home and away from home  · Wash hands frequently and thoroughly with soap and water (lather at least 20 seconds) or disinfect with alcohol-based hand  before eating, before touching face/mouth/eyes, after touching shared objects or high touch surfaces.  · Regularly clean cell phones, door handles, light switches, faucet and toilet handles, bathrooms, and kitchen surfaces using household disinfectant or hot soapy water.  · No visitors except a support person for the patient for the following:  · Active POAs  · Patients less than 18 years old can have parent  · Patients with diagnosed and documented dementia who need help  · Patients who need assistance making it to the appropriate department safely. (Once they are to the department the support person will be asked to leave)  · It will be an expectation for the patient/support person to wear a mask at all times during their stay.          
 Patient informed that their COVID test was negative.    Instructions for patients :  · Do NOT travel out of home area   · Avoid outings from home-leave ONLY for essential needs  · Immediately report any new symptoms or suspected/known exposures to COVID-19 cases   · Maintain physical distancing (at least 6 feet) at all times both at home and away from home  · Wash hands frequently and thoroughly with soap and water (lather at least 20 seconds) or disinfect with alcohol-based hand  before eating, before touching face/mouth/eyes, after touching shared objects or high touch surfaces.  · Regularly clean cell phones, door handles, light switches, faucet and toilet handles, bathrooms, and kitchen surfaces using household disinfectant or hot soapy water.  · No visitors except a support person for the patient for the following:  · Active POAs  · Patients less than 18 years old can have parent  · Patients with diagnosed and documented dementia who need help  · Patients who need assistance making it to the appropriate department safely. (Once they are to the department the support person will be asked to leave)  · It will be an expectation for the patient/support person to wear a mask at all times during their stay.          
6

## 2022-09-20 ENCOUNTER — APPOINTMENT (OUTPATIENT)
Dept: INTERNAL MEDICINE | Facility: CLINIC | Age: 57
End: 2022-09-20

## 2022-09-20 ENCOUNTER — OUTPATIENT (OUTPATIENT)
Dept: OUTPATIENT SERVICES | Facility: HOSPITAL | Age: 57
LOS: 1 days | End: 2022-09-20
Payer: SELF-PAY

## 2022-09-20 ENCOUNTER — NON-APPOINTMENT (OUTPATIENT)
Age: 57
End: 2022-09-20

## 2022-09-20 VITALS
SYSTOLIC BLOOD PRESSURE: 110 MMHG | HEIGHT: 61 IN | OXYGEN SATURATION: 95 % | HEART RATE: 65 BPM | BODY MASS INDEX: 47.2 KG/M2 | DIASTOLIC BLOOD PRESSURE: 74 MMHG | WEIGHT: 250 LBS

## 2022-09-20 DIAGNOSIS — I10 ESSENTIAL (PRIMARY) HYPERTENSION: ICD-10-CM

## 2022-09-20 LAB — HBA1C MFR BLD HPLC: 6.6

## 2022-09-20 PROCEDURE — ZZZZZ: CPT

## 2022-09-20 PROCEDURE — 83036 HEMOGLOBIN GLYCOSYLATED A1C: CPT

## 2022-09-20 PROCEDURE — 90688 IIV4 VACCINE SPLT 0.5 ML IM: CPT

## 2022-09-20 PROCEDURE — G0008: CPT

## 2022-09-20 PROCEDURE — G0463: CPT | Mod: 25

## 2022-09-21 NOTE — PHYSICAL EXAM
[No Acute Distress] : no acute distress [Well Nourished] : well nourished [Well Developed] : well developed [Well-Appearing] : well-appearing [Normal Outer Ear/Nose] : the outer ears and nose were normal in appearance [No Respiratory Distress] : no respiratory distress  [No Accessory Muscle Use] : no accessory muscle use [Clear to Auscultation] : lungs were clear to auscultation bilaterally [Normal Rate] : normal rate  [Regular Rhythm] : with a regular rhythm [Normal S1, S2] : normal S1 and S2 [No Joint Swelling] : no joint swelling [Grossly Normal Strength/Tone] : grossly normal strength/tone [No Rash] : no rash [Coordination Grossly Intact] : coordination grossly intact [No Focal Deficits] : no focal deficits [Speech Grossly Normal] : speech grossly normal [Memory Grossly Normal] : memory grossly normal [Normal Affect] : the affect was normal [Alert and Oriented x3] : oriented to person, place, and time [Normal Mood] : the mood was normal [Normal Insight/Judgement] : insight and judgment were intact [de-identified] : s [de-identified] : left lower extremity edema with mild point tenderness on anterior lateral and posterior medial thigh; multiple varicosities present; no erythema [de-identified] : antalgic gait, favoring right side

## 2022-09-21 NOTE — HISTORY OF PRESENT ILLNESS
[FreeTextEntry8] : This is a 55y/o woman with a history of DM2, HLD, and DVTs presenting after an ED visit for new L femoral DVT. \par \par On Sunday, the patient began experiencing left thigh pain, so she presented to the ED for thrombosis work up. They found a new L femoral DVT w/o evidence of PE. They prescribed apixaban 10mg BID and recommended that she follow up outpatient. \par \par Today, the patient's primary concern is leg pain. She has been taking acetaminophen 1g sporadically. She denies chest pain, shortness of breath, headaches, blurry vision, eye pain, back pain, nausea, vomiting, diarrhea, constipation, fevers, chills, and falls.

## 2022-09-22 DIAGNOSIS — I82.409 ACUTE EMBOLISM AND THROMBOSIS OF UNSPECIFIED DEEP VEINS OF UNSPECIFIED LOWER EXTREMITY: ICD-10-CM

## 2022-09-22 DIAGNOSIS — E11.9 TYPE 2 DIABETES MELLITUS WITHOUT COMPLICATIONS: ICD-10-CM

## 2022-09-22 DIAGNOSIS — E78.5 HYPERLIPIDEMIA, UNSPECIFIED: ICD-10-CM

## 2023-01-18 ENCOUNTER — RX RENEWAL (OUTPATIENT)
Age: 58
End: 2023-01-18

## 2023-03-01 ENCOUNTER — APPOINTMENT (OUTPATIENT)
Dept: INTERNAL MEDICINE | Facility: CLINIC | Age: 58
End: 2023-03-01
Payer: COMMERCIAL

## 2023-03-01 ENCOUNTER — OUTPATIENT (OUTPATIENT)
Dept: OUTPATIENT SERVICES | Facility: HOSPITAL | Age: 58
LOS: 1 days | End: 2023-03-01
Payer: SELF-PAY

## 2023-03-01 VITALS
HEART RATE: 78 BPM | WEIGHT: 237 LBS | DIASTOLIC BLOOD PRESSURE: 80 MMHG | OXYGEN SATURATION: 95 % | HEIGHT: 61 IN | BODY MASS INDEX: 44.75 KG/M2 | SYSTOLIC BLOOD PRESSURE: 124 MMHG

## 2023-03-01 DIAGNOSIS — E11.9 TYPE 2 DIABETES MELLITUS WITHOUT COMPLICATIONS: ICD-10-CM

## 2023-03-01 DIAGNOSIS — I82.409 ACUTE EMBOLISM AND THROMBOSIS OF UNSPECIFIED DEEP VEINS OF UNSPECIFIED LOWER EXTREMITY: ICD-10-CM

## 2023-03-01 DIAGNOSIS — E66.9 OBESITY, UNSPECIFIED: ICD-10-CM

## 2023-03-01 DIAGNOSIS — I10 ESSENTIAL (PRIMARY) HYPERTENSION: ICD-10-CM

## 2023-03-01 DIAGNOSIS — I31.39 OTHER PERICARDIAL EFFUSION (NONINFLAMMATORY): ICD-10-CM

## 2023-03-01 LAB — HBA1C MFR BLD HPLC: 6

## 2023-03-01 PROCEDURE — ZZZZZ: CPT

## 2023-03-01 PROCEDURE — 80061 LIPID PANEL: CPT

## 2023-03-01 PROCEDURE — G0463: CPT

## 2023-03-03 ENCOUNTER — NON-APPOINTMENT (OUTPATIENT)
Age: 58
End: 2023-03-03

## 2023-03-03 LAB
CHOLEST SERPL-MCNC: 114 MG/DL
HDLC SERPL-MCNC: 47 MG/DL
LDLC SERPL CALC-MCNC: 56 MG/DL
NONHDLC SERPL-MCNC: 67 MG/DL
TRIGL SERPL-MCNC: 56 MG/DL

## 2023-03-12 NOTE — HISTORY OF PRESENT ILLNESS
[FreeTextEntry1] : 56 year old female, PMH GERD, pericardial effusion (2013, unclear etiology), T2DM, obesity, LLE unprovoked DVT on Eliquis, presenting for f/u  [de-identified] : 56 year old female, PMH GERD, pericardial effusion (2013, unclear etiology), T2DM, obesity, LLE unprovoked DVT on Eliquis, presenting for a f/u. Patient says she has changed her diet tremendously, she has cut out carbs and fats. She eats a lot of salsads and drinks a lot of water, she avoids white rice, breads, and pasta; also does not drink juices and does not have desserts. She is complaining of some minor lower ext pain B/L that responds well to tylenol PRN. She works as a  and is bina active on a daily basis. She saw Cards last summer 2022 for her hx of pericardial effusion, her TTE at that time was wnl, no pericardial effusion noted and overall systolic function and filling pressures found to be wnl. Denies etoh use or smoking. Denies recent fevers, chills, sob, cp, abd pain, n/v, diarrhea, constipation.

## 2023-03-12 NOTE — ASSESSMENT
[FreeTextEntry1] : 56 year old female, PMH GERD, pericardial effusion (2013, unclear etiology), T2DM, obesity, LLE unprovoked DVT on Eliquis, presenting for a f/u. 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 Pt last saw them 2022 with plans to f/u in 6 months but was lost to f/u.\par \par #HCM \par - Check lipid panel \par - Flu vaccine \par \par D/w Dr. Khalil

## 2023-03-12 NOTE — END OF VISIT
[] : Resident [FreeTextEntry3] : As above, with now 2 unprovoked DVT's, likely will need life long anticoagulation.

## 2023-03-13 DIAGNOSIS — I31.39 OTHER PERICARDIAL EFFUSION (NONINFLAMMATORY): ICD-10-CM

## 2023-03-13 DIAGNOSIS — E66.9 OBESITY, UNSPECIFIED: ICD-10-CM

## 2023-07-03 ENCOUNTER — APPOINTMENT (OUTPATIENT)
Dept: INTERNAL MEDICINE | Facility: CLINIC | Age: 58
End: 2023-07-03

## 2023-08-22 ENCOUNTER — OUTPATIENT (OUTPATIENT)
Dept: OUTPATIENT SERVICES | Facility: HOSPITAL | Age: 58
LOS: 1 days | End: 2023-08-22
Payer: SELF-PAY

## 2023-08-22 ENCOUNTER — NON-APPOINTMENT (OUTPATIENT)
Age: 58
End: 2023-08-22

## 2023-08-22 ENCOUNTER — APPOINTMENT (OUTPATIENT)
Dept: CARDIOLOGY | Facility: HOSPITAL | Age: 58
End: 2023-08-22

## 2023-08-22 VITALS
WEIGHT: 224 LBS | OXYGEN SATURATION: 95 % | HEART RATE: 60 BPM | SYSTOLIC BLOOD PRESSURE: 108 MMHG | DIASTOLIC BLOOD PRESSURE: 79 MMHG | BODY MASS INDEX: 42.29 KG/M2 | HEIGHT: 61 IN

## 2023-08-22 DIAGNOSIS — I25.10 ATHEROSCLEROTIC HEART DISEASE OF NATIVE CORONARY ARTERY WITHOUT ANGINA PECTORIS: ICD-10-CM

## 2023-08-22 PROCEDURE — G0463: CPT

## 2023-08-22 PROCEDURE — 93005 ELECTROCARDIOGRAM TRACING: CPT

## 2023-08-22 NOTE — CARDIOLOGY SUMMARY
[de-identified] : 9/2022: ANAMARIA [id number] [de-identified] : 2/2016  NUCLEAR FINDINGS: The left ventricle was normal in size. Normal myocardial perfusion scan, with no evidence ofinfarction or inducible ischemia. ------------------------------------------------------------------------ GATED ANALYSIS: Post-stress gated wall motion analysis was performed (LVEF = 65 %;LVEDV = 87 ml.) ------------------------------------------------------------------------ IMPRESSIONS:Normal Study * Exercise capacity: 8 METS, Fair for age and gender. 97% of MPHR. * Chest Pain: No chest pain with exercise. * Symptom: no chest pain. * HR Response: Excessive increase in HR with stress due to poor physical condition and/or reduced cardiovascular reserve. * BP Response: Appropriate. * Heart Rhythm: Sinus Bradycardia - 56 BPM. * Baseline ECG: No significant ST abnormalities. * ECG Changes: No ischemic ECG changes from baseline. . * Arrhythmia: Frequent VPDs occurred during stress and recovery. * The left ventricle was normal in size. Normal myocardial perfusion scan, with no evidence of infarction or inducible ischemia. * Post-stress gated wall motion analysis was performed (LVEF = 65 %;LVEDV = 87 ml.) * No previous Nuclear/Stress exam.  [de-identified] : 6/2022 Dimensions:    Normal Values: LA:     4.2    2.0 - 4.0 cm Ao:     2.7    2.0 - 3.8 cm SEPTUM: 0.9    0.6 - 1.2 cm PWT:    0.8    0.6 - 1.1 cm LVIDd:  5.1    3.0 - 5.6 cm LVIDs:  3.1    1.8 - 4.0 cm Derived variables: LVMI: 72 g/m2 RWT: 0.31 Fractional short: 39 % EF (Modified Singleton Rule): 64 % ------------------------------------------------------------------------ Observations: Mitral Valve: Normal mitral valve. No mitral regurgitation seen. Aortic Valve/Aorta: Aortic valve poorly visualized.  No aortic valve regurgitation seen. Aortic Root: 2.7 cm. LVOT diameter: 2 cm. Left Atrium:Moderately dilated left atrium.  LA volume index = 44 cc/m2. Left Ventricle: Normal left ventricular systolic function. No segmental wall motion abnormalities.LVEF using 2D echo was 64%.  Normal left ventricular internal dimensions and wall thicknesses. Normal diastolic function Right Heart: Normal right atrium. Normal right ventricular size and function. Normal tricuspid valve. Minimal tricuspid regurgitation. Normal pulmonic valve. Pericardium/Pleura: No pericardial effusion seen. Hemodynamic: Estimated right ventricular systolic pressure equals 35 mm Hg, assuming right atrial pressure equals 3 mm Hg, consistent with borderline pulmonary hypertension. ------------------------------------------------------------------------ Conclusions: 1. Moderately dilated left atrium.  LA volume index = 44 cc/m2. 2. Normal left ventricular internal dimensions and wall thicknesses. 3. Normal left ventricular systolic function. No segmental wall motion abnormalities.LVEF using 2D echo was 64%. 4. Normal diastolic function 5. Normal right atrium. 6. Normal right ventricular size and function. 7. Estimated right ventricular systolic pressure equals 35 mm Hg, assuming right atrial pressure equals 3 mm Hg, consistent with borderline pulmonary hypertension. 8. Normal tricuspid valve. Minimal tricuspid regurgitation. 9. Normal pulmonic valve. Estimated pulmonary artery systolic pressure equals 35  mm Hg, assuming right atrial pressure equals 3 mm Hg, consistent with borderline pulmonary pressures. 10. No pericardial effusion seen. *** Compared with echocardiogram of 8/14/2020, no significant changes noted.

## 2023-08-22 NOTE — DISCUSSION/SUMMARY
[FreeTextEntry1] : 56 year old obese female NIDDM (A1c 6), HTN, GERD, pericardial effusion/tamponade s/p IR drainage (12/2013), provoked LLE DVT (7/2019) previously on Eliquis who presents for followup.   Recent TTE reviewed and wnl, no pericardial effusion noted and overall systolic function and filling pressures found to be wnl; mildly elevated R sided pressures but at present not clinically significant. Recent labs reviewed and TC and LDL both at goal on statin, additionally pt has lost ~25 lbs since last years visit.   #H/o pericardial effusion: No evidence of effusion noted on recent TTE 6/2022 - CTM; no indication for repeat TTE at this time  #HLD: , . previously, most recent lipid panel improved to  and LDL 56 - encouraged to continue diet and exercise plan with further weight loss goals in the next year - continue Atorvastatin 20 mg qd   #NIDDM: A1c max 6.6 in 2022, down to 6 as of 3/2023. Pt with intentional weight loss and dietary discretion leading to weight loss and improvement in glycemic control - continue Metformin 500 mg bid - due to repeat a1c with pcp   #DVT - continue Eliquis 5 mg bid

## 2023-08-22 NOTE — HISTORY OF PRESENT ILLNESS
[FreeTextEntry1] : 56 year old obese female NIDDM (A1c 6), HTN, GERD, pericardial effusion/tamponade s/p IR drainage (12/2013), provoked LLE DVT (7/2019) on Eliquis who presents for followup.  Last seen 7/2022. Regarding prior cardiac hx, has NST 2/2016 wnl and TTE in 7/2017 also wnl. Pt last recently seen by PCP 5/2022 with c/o subacute L chest pain and occasional palpitations and given her history, cardiology referral placed along with TTE and routine labwork ordered. Labwork notable primarily for lipid profile revealed elevated TC and LDL above goal. Recent TTE with EF 64%, no valvular abnormalities, no pericardial effusion, borderline elevation in R sided pressures with RVSP 35.   On interview today, pt denies any recurrence of chest pain, nausea vomiting. Reports no change in exercise tolerance, denies orthopnea, PND, LE edema. At her USOH. She notes some foot and leg pain that resembles sciatica.

## 2023-08-24 ENCOUNTER — APPOINTMENT (OUTPATIENT)
Dept: INTERNAL MEDICINE | Facility: CLINIC | Age: 58
End: 2023-08-24
Payer: COMMERCIAL

## 2023-08-24 ENCOUNTER — OUTPATIENT (OUTPATIENT)
Dept: OUTPATIENT SERVICES | Facility: HOSPITAL | Age: 58
LOS: 1 days | End: 2023-08-24
Payer: SELF-PAY

## 2023-08-24 ENCOUNTER — NON-APPOINTMENT (OUTPATIENT)
Age: 58
End: 2023-08-24

## 2023-08-24 ENCOUNTER — RESULT REVIEW (OUTPATIENT)
Age: 58
End: 2023-08-24

## 2023-08-24 VITALS
OXYGEN SATURATION: 94 % | HEIGHT: 61 IN | DIASTOLIC BLOOD PRESSURE: 70 MMHG | SYSTOLIC BLOOD PRESSURE: 114 MMHG | HEART RATE: 65 BPM | WEIGHT: 224 LBS | BODY MASS INDEX: 42.29 KG/M2

## 2023-08-24 DIAGNOSIS — E78.5 HYPERLIPIDEMIA, UNSPECIFIED: ICD-10-CM

## 2023-08-24 DIAGNOSIS — E11.9 TYPE 2 DIABETES MELLITUS WITHOUT COMPLICATIONS: ICD-10-CM

## 2023-08-24 DIAGNOSIS — I10 ESSENTIAL (PRIMARY) HYPERTENSION: ICD-10-CM

## 2023-08-24 DIAGNOSIS — I31.39 OTHER PERICARDIAL EFFUSION (NONINFLAMMATORY): ICD-10-CM

## 2023-08-24 PROCEDURE — 83036 HEMOGLOBIN GLYCOSYLATED A1C: CPT

## 2023-08-24 PROCEDURE — 80053 COMPREHEN METABOLIC PANEL: CPT

## 2023-08-24 PROCEDURE — G0463: CPT

## 2023-08-24 PROCEDURE — 99213 OFFICE O/P EST LOW 20 MIN: CPT | Mod: GE

## 2023-08-24 RX ORDER — APIXABAN 5 MG/1
5 TABLET, FILM COATED ORAL
Qty: 180 | Refills: 3 | Status: ACTIVE | OUTPATIENT
Start: 2022-09-20

## 2023-08-24 NOTE — PHYSICAL EXAM
[No Acute Distress] : no acute distress [Well Nourished] : well nourished [Well Developed] : well developed [No Respiratory Distress] : no respiratory distress  [No Accessory Muscle Use] : no accessory muscle use [Clear to Auscultation] : lungs were clear to auscultation bilaterally [Normal Rate] : normal rate  [Regular Rhythm] : with a regular rhythm [Normal S1, S2] : normal S1 and S2 [Soft] : abdomen soft [Non Tender] : non-tender [Non-distended] : non-distended [No Joint Swelling] : no joint swelling [No Rash] : no rash

## 2023-08-25 LAB
ALBUMIN SERPL ELPH-MCNC: 4.4 G/DL
ALP BLD-CCNC: 132 U/L
ALT SERPL-CCNC: 17 U/L
ANION GAP SERPL CALC-SCNC: 12 MMOL/L
AST SERPL-CCNC: 17 U/L
BILIRUB SERPL-MCNC: 0.2 MG/DL
BUN SERPL-MCNC: 18 MG/DL
CALCIUM SERPL-MCNC: 9.8 MG/DL
CHLORIDE SERPL-SCNC: 100 MMOL/L
CO2 SERPL-SCNC: 26 MMOL/L
CREAT SERPL-MCNC: 0.65 MG/DL
EGFR: 104 ML/MIN/1.73M2
ESTIMATED AVERAGE GLUCOSE: 131 MG/DL
GLUCOSE SERPL-MCNC: 100 MG/DL
HBA1C MFR BLD HPLC: 6.2 %
POTASSIUM SERPL-SCNC: 4.5 MMOL/L
PROT SERPL-MCNC: 7.2 G/DL
SODIUM SERPL-SCNC: 139 MMOL/L

## 2023-08-25 NOTE — HISTORY OF PRESENT ILLNESS
[FreeTextEntry1] : 57 year old female, PMH GERD, pericardial effusion (2013, unclear etiology), T2DM, obesity, LLE unprovoked DVT on Eliquis, presenting for a f/u. [de-identified] : 57 year old female, PMH GERD, pericardial effusion (2013, unclear etiology), T2DM, obesity, LLE unprovoked DVT on Eliquis, presenting for a f/u. Patient says she has changed her diet tremendously, she has cut out carbs and fats. She eats a lot of salsads and drinks a lot of water, she avoids white rice, breads, and pasta; also does not drink juices and does not have desserts. She is a  and is bina active. Also here with dorms for her Eliquis. Today she is mainly coming in for forms to be filled out for her eliquis medications. Denies fever, chills, sob, cp, abd pain, n/v, diarrhea.

## 2023-08-25 NOTE — ASSESSMENT
[FreeTextEntry1] : 57 year old female, PMH GERD, pericardial effusion (2013, unclear etiology), T2DM, obesity, LLE unprovoked DVT on Eliquis, presenting for a f/u and forms.   #DVT  - Patietn brought forms to be filled so she can obtain her Eliquis medication. Forms filled out, and given to DARLENE Escamilla.  - C/w Eliquis   #HTN - c/w amlodipine   #HCM  - Colonocopy referral given - [facility]  - A1c, CMP   D/w Dr. Figueroa

## 2023-08-28 ENCOUNTER — NON-APPOINTMENT (OUTPATIENT)
Age: 58
End: 2023-08-28

## 2023-08-29 ENCOUNTER — RESULT REVIEW (OUTPATIENT)
Age: 58
End: 2023-08-29

## 2023-08-29 ENCOUNTER — APPOINTMENT (OUTPATIENT)
Dept: OBGYN | Facility: CLINIC | Age: 58
End: 2023-08-29
Payer: SELF-PAY

## 2023-08-29 ENCOUNTER — LABORATORY RESULT (OUTPATIENT)
Age: 58
End: 2023-08-29

## 2023-08-29 ENCOUNTER — OUTPATIENT (OUTPATIENT)
Dept: OUTPATIENT SERVICES | Facility: HOSPITAL | Age: 58
LOS: 1 days | End: 2023-08-29
Payer: SELF-PAY

## 2023-08-29 VITALS — WEIGHT: 225 LBS | DIASTOLIC BLOOD PRESSURE: 80 MMHG | SYSTOLIC BLOOD PRESSURE: 120 MMHG | BODY MASS INDEX: 42.51 KG/M2

## 2023-08-29 DIAGNOSIS — Z00.00 ENCOUNTER FOR GENERAL ADULT MEDICAL EXAMINATION WITHOUT ABNORMAL FINDINGS: ICD-10-CM

## 2023-08-29 DIAGNOSIS — N76.0 ACUTE VAGINITIS: ICD-10-CM

## 2023-08-29 DIAGNOSIS — I82.409 ACUTE EMBOLISM AND THROMBOSIS OF UNSPECIFIED DEEP VEINS OF UNSPECIFIED LOWER EXTREMITY: ICD-10-CM

## 2023-08-29 DIAGNOSIS — E11.9 TYPE 2 DIABETES MELLITUS WITHOUT COMPLICATIONS: ICD-10-CM

## 2023-08-29 DIAGNOSIS — Z01.419 ENCOUNTER FOR GYNECOLOGICAL EXAMINATION (GENERAL) (ROUTINE) W/OUT ABNORMAL FINDINGS: ICD-10-CM

## 2023-08-29 PROCEDURE — G0463: CPT

## 2023-08-29 PROCEDURE — 87624 HPV HI-RISK TYP POOLED RSLT: CPT

## 2023-08-29 PROCEDURE — 99386 PREV VISIT NEW AGE 40-64: CPT

## 2023-08-29 NOTE — PROCEDURE
[Cervical Pap Smear] : cervical Pap smear [Vaginal Pap Smear] : vaginal Pap smear [Liquid Base] : liquid base [Endometrial Biopsy] : Endometrial biopsy [Required Dilation] : required dilation [de-identified] : polyp found on ultrasound 7/5/22 [de-identified] : Cytotec  [de-identified] : some discomfort

## 2023-08-29 NOTE — DISCUSSION/SUMMARY
[FreeTextEntry1] : 58yo P3 here for annual exam  # EM polyp - discussed at length- setting of PM EM polyp >1.5cm would recommend removal.  Pt denies PMB, would rather not have removed.  Discussed increased risk of of malignancy or hyperplasia. - [ ]sonohystogram  at pt request to confirm polyp still present- would then consider removal  # HCM - [ ]pap/hpv collected - [ ]Mammo  - Colonoscopy up to date - gen health followed by medicine  Will call with sonohystogram result- t/c D&C hysteroscopy/polypectomy RTC for ANY PMB Alex Parkinson, PAC

## 2023-08-29 NOTE — HISTORY OF PRESENT ILLNESS
[Patient reported PAP Smear was normal] : Patient reported PAP Smear was normal [postmenopausal] : postmenopausal [N] : Patient reports normal menses [Y] : Patient is sexually active [Ultrasound] : ultrasound [No] : Patient does not have concerns regarding sex [FreeTextEntry1] : 56yo  (LMP 51yo) presents for annual gyn visit. Hx of endometrial polyps last visualized (22), upon her last ultrasound she was told the polyp did not grow in size so she could wait and watch it. Denies PMB and pain.  Denies change in bowel or bladder habits.   - PAP 2020 neg/HPV neg - Mammo 2022 Birads 2 - Colonoscopy- 2018  [facility] followed by medicine   [PapSmeardate] : 7/5/22 [LMPDate] : 2014 [PGHxTotal] : 3 [[facility]] : [facility] [PGHxPremature] : 0 [PGHxAbortions] : 0 [[facility]] : 3 [TextBox_27] : sonohysterogram done 7/5/22, visualized polyp, told if any PMB she must return and remove polyp.

## 2023-08-29 NOTE — REVIEW OF SYSTEMS
[Recent Weight Loss (___ Lbs)] : recent [unfilled] ~Ulb weight loss [Negative] : Heme/Lymph [Fever] : no fever [Chills] : no chills [Fatigue] : no fatigue [Poor Appetite] : appetite not poor [Night Sweats] : no night sweats [Breast Pain] : no breast pain [Breast Lump] : no breast lump [Nipple Changes] : no nipple changes [Nipple Discharge] : no nipple discharge

## 2023-08-29 NOTE — PHYSICAL EXAM
[Chaperone Present] : A chaperone was present in the examining room during all aspects of the physical examination [Appropriately responsive] : appropriately responsive [No Lymphadenopathy] : no lymphadenopathy [No Masses] : no breast masses were palpable [Labia Majora] : normal [Labia Minora] : normal [FreeTextEntry1] : PAS Kimberley  [Soft] : soft [Non-tender] : non-tender [Non-distended] : non-distended [No Mass] : no mass [Oriented x3] : oriented x3 [Examination Of The Breasts] : a normal appearance [No Bleeding] : There was no active vaginal bleeding [Normal] : normal [Normal Position] : in a normal position [Tenderness] : nontender [Uterine Adnexae] : normal [FreeTextEntry6] : limited 2/2 bh

## 2023-08-30 LAB — HPV HIGH+LOW RISK DNA PNL CVX: SIGNIFICANT CHANGE UP

## 2023-08-31 LAB — CYTOLOGY SPEC DOC CYTO: SIGNIFICANT CHANGE UP

## 2023-09-05 ENCOUNTER — OUTPATIENT (OUTPATIENT)
Dept: OUTPATIENT SERVICES | Facility: HOSPITAL | Age: 58
LOS: 1 days | End: 2023-09-05
Payer: SELF-PAY

## 2023-09-05 ENCOUNTER — RESULT REVIEW (OUTPATIENT)
Age: 58
End: 2023-09-05

## 2023-09-05 ENCOUNTER — APPOINTMENT (OUTPATIENT)
Dept: MAMMOGRAPHY | Facility: IMAGING CENTER | Age: 58
End: 2023-09-05
Payer: SELF-PAY

## 2023-09-05 DIAGNOSIS — Z00.8 ENCOUNTER FOR OTHER GENERAL EXAMINATION: ICD-10-CM

## 2023-09-05 DIAGNOSIS — N84.0 POLYP OF CORPUS UTERI: ICD-10-CM

## 2023-09-05 DIAGNOSIS — Z01.419 ENCOUNTER FOR GYNECOLOGICAL EXAMINATION (GENERAL) (ROUTINE) WITHOUT ABNORMAL FINDINGS: ICD-10-CM

## 2023-09-05 DIAGNOSIS — E11.9 TYPE 2 DIABETES MELLITUS WITHOUT COMPLICATIONS: ICD-10-CM

## 2023-09-05 PROCEDURE — 77067 SCR MAMMO BI INCL CAD: CPT | Mod: 26

## 2023-09-05 PROCEDURE — 77063 BREAST TOMOSYNTHESIS BI: CPT

## 2023-09-05 PROCEDURE — 77067 SCR MAMMO BI INCL CAD: CPT

## 2023-09-05 PROCEDURE — 77063 BREAST TOMOSYNTHESIS BI: CPT | Mod: 26

## 2023-09-07 RX ORDER — ATORVASTATIN CALCIUM 20 MG/1
20 TABLET, FILM COATED ORAL DAILY
Qty: 90 | Refills: 3 | Status: ACTIVE | COMMUNITY
Start: 2022-09-20 | End: 1900-01-01

## 2023-09-07 RX ORDER — METFORMIN HYDROCHLORIDE 500 MG/1
500 TABLET, COATED ORAL
Qty: 180 | Refills: 2 | Status: ACTIVE | COMMUNITY
Start: 2020-11-03 | End: 1900-01-01

## 2023-09-11 DIAGNOSIS — Z01.419 ENCOUNTER FOR GYNECOLOGICAL EXAMINATION (GENERAL) (ROUTINE) WITHOUT ABNORMAL FINDINGS: ICD-10-CM

## 2023-09-11 DIAGNOSIS — N84.0 POLYP OF CORPUS UTERI: ICD-10-CM

## 2023-09-12 ENCOUNTER — RESULT REVIEW (OUTPATIENT)
Age: 58
End: 2023-09-12

## 2023-09-12 ENCOUNTER — OUTPATIENT (OUTPATIENT)
Dept: OUTPATIENT SERVICES | Facility: HOSPITAL | Age: 58
LOS: 1 days | End: 2023-09-12
Payer: SELF-PAY

## 2023-09-12 ENCOUNTER — APPOINTMENT (OUTPATIENT)
Dept: ULTRASOUND IMAGING | Facility: IMAGING CENTER | Age: 58
End: 2023-09-12
Payer: SELF-PAY

## 2023-09-12 ENCOUNTER — APPOINTMENT (OUTPATIENT)
Dept: MAMMOGRAPHY | Facility: IMAGING CENTER | Age: 58
End: 2023-09-12
Payer: SELF-PAY

## 2023-09-12 DIAGNOSIS — Z00.8 ENCOUNTER FOR OTHER GENERAL EXAMINATION: ICD-10-CM

## 2023-09-12 PROCEDURE — G0279: CPT | Mod: 26

## 2023-09-12 PROCEDURE — 77065 DX MAMMO INCL CAD UNI: CPT

## 2023-09-12 PROCEDURE — 76642 ULTRASOUND BREAST LIMITED: CPT | Mod: 26,RT

## 2023-09-12 PROCEDURE — 77065 DX MAMMO INCL CAD UNI: CPT | Mod: 26,RT

## 2023-09-12 PROCEDURE — G0279: CPT

## 2023-09-12 PROCEDURE — 76642 ULTRASOUND BREAST LIMITED: CPT

## 2023-09-20 ENCOUNTER — APPOINTMENT (OUTPATIENT)
Dept: ULTRASOUND IMAGING | Facility: CLINIC | Age: 58
End: 2023-09-20
Payer: COMMERCIAL

## 2023-09-20 ENCOUNTER — OUTPATIENT (OUTPATIENT)
Dept: OUTPATIENT SERVICES | Facility: HOSPITAL | Age: 58
LOS: 1 days | End: 2023-09-20
Payer: SELF-PAY

## 2023-09-20 DIAGNOSIS — N84.0 POLYP OF CORPUS UTERI: ICD-10-CM

## 2023-09-20 PROCEDURE — 58340 CATHETER FOR HYSTEROGRAPHY: CPT

## 2023-09-20 PROCEDURE — 76831 ECHO EXAM UTERUS: CPT

## 2023-09-20 PROCEDURE — 76831 ECHO EXAM UTERUS: CPT | Mod: 26

## 2023-09-25 ENCOUNTER — MED ADMIN CHARGE (OUTPATIENT)
Age: 58
End: 2023-09-25

## 2023-09-25 ENCOUNTER — OUTPATIENT (OUTPATIENT)
Dept: OUTPATIENT SERVICES | Facility: HOSPITAL | Age: 58
LOS: 1 days | End: 2023-09-25
Payer: SELF-PAY

## 2023-09-25 ENCOUNTER — APPOINTMENT (OUTPATIENT)
Dept: INTERNAL MEDICINE | Facility: CLINIC | Age: 58
End: 2023-09-25
Payer: COMMERCIAL

## 2023-09-25 VITALS
DIASTOLIC BLOOD PRESSURE: 70 MMHG | WEIGHT: 228 LBS | OXYGEN SATURATION: 94 % | HEIGHT: 61 IN | SYSTOLIC BLOOD PRESSURE: 114 MMHG | HEART RATE: 68 BPM | BODY MASS INDEX: 43.05 KG/M2

## 2023-09-25 DIAGNOSIS — E66.01 MORBID (SEVERE) OBESITY DUE TO EXCESS CALORIES: ICD-10-CM

## 2023-09-25 DIAGNOSIS — I10 ESSENTIAL (PRIMARY) HYPERTENSION: ICD-10-CM

## 2023-09-25 DIAGNOSIS — E78.5 HYPERLIPIDEMIA, UNSPECIFIED: ICD-10-CM

## 2023-09-25 DIAGNOSIS — E11.9 TYPE 2 DIABETES MELLITUS WITHOUT COMPLICATIONS: ICD-10-CM

## 2023-09-25 DIAGNOSIS — I82.409 ACUTE EMBOLISM AND THROMBOSIS OF UNSPECIFIED DEEP VEINS OF UNSPECIFIED LOWER EXTREMITY: ICD-10-CM

## 2023-09-25 DIAGNOSIS — Z00.00 ENCOUNTER FOR GENERAL ADULT MEDICAL EXAMINATION W/OUT ABNORMAL FINDINGS: ICD-10-CM

## 2023-09-25 DIAGNOSIS — Z00.00 ENCOUNTER FOR GENERAL ADULT MEDICAL EXAMINATION WITHOUT ABNORMAL FINDINGS: ICD-10-CM

## 2023-09-25 PROCEDURE — 99213 OFFICE O/P EST LOW 20 MIN: CPT | Mod: GC

## 2023-09-25 PROCEDURE — G0463: CPT

## 2023-10-12 ENCOUNTER — OUTPATIENT (OUTPATIENT)
Dept: OUTPATIENT SERVICES | Facility: HOSPITAL | Age: 58
LOS: 1 days | End: 2023-10-12
Payer: SELF-PAY

## 2023-10-12 ENCOUNTER — APPOINTMENT (OUTPATIENT)
Dept: OBGYN | Facility: CLINIC | Age: 58
End: 2023-10-12
Payer: COMMERCIAL

## 2023-10-12 VITALS — DIASTOLIC BLOOD PRESSURE: 80 MMHG | WEIGHT: 227 LBS | SYSTOLIC BLOOD PRESSURE: 128 MMHG | BODY MASS INDEX: 42.89 KG/M2

## 2023-10-12 DIAGNOSIS — N76.0 ACUTE VAGINITIS: ICD-10-CM

## 2023-10-12 PROCEDURE — G0463: CPT

## 2023-10-12 PROCEDURE — 99214 OFFICE O/P EST MOD 30 MIN: CPT | Mod: GC

## 2023-10-23 DIAGNOSIS — N84.0 POLYP OF CORPUS UTERI: ICD-10-CM

## 2023-11-28 ENCOUNTER — APPOINTMENT (OUTPATIENT)
Dept: CARDIOLOGY | Facility: HOSPITAL | Age: 58
End: 2023-11-28

## 2023-11-28 ENCOUNTER — OUTPATIENT (OUTPATIENT)
Dept: OUTPATIENT SERVICES | Facility: HOSPITAL | Age: 58
LOS: 1 days | End: 2023-11-28
Payer: SELF-PAY

## 2023-11-28 VITALS
BODY MASS INDEX: 43.8 KG/M2 | SYSTOLIC BLOOD PRESSURE: 105 MMHG | OXYGEN SATURATION: 95 % | WEIGHT: 232 LBS | DIASTOLIC BLOOD PRESSURE: 70 MMHG | HEIGHT: 61 IN | HEART RATE: 62 BPM

## 2023-11-28 DIAGNOSIS — I25.10 ATHEROSCLEROTIC HEART DISEASE OF NATIVE CORONARY ARTERY WITHOUT ANGINA PECTORIS: ICD-10-CM

## 2023-11-28 PROCEDURE — 93005 ELECTROCARDIOGRAM TRACING: CPT

## 2023-11-28 PROCEDURE — G0463: CPT

## 2023-11-29 ENCOUNTER — NON-APPOINTMENT (OUTPATIENT)
Age: 58
End: 2023-11-29

## 2023-11-30 DIAGNOSIS — I10 ESSENTIAL (PRIMARY) HYPERTENSION: ICD-10-CM

## 2023-11-30 DIAGNOSIS — E78.5 HYPERLIPIDEMIA, UNSPECIFIED: ICD-10-CM

## 2023-12-18 ENCOUNTER — OUTPATIENT (OUTPATIENT)
Dept: OUTPATIENT SERVICES | Facility: HOSPITAL | Age: 58
LOS: 1 days | End: 2023-12-18
Payer: SELF-PAY

## 2023-12-18 VITALS
SYSTOLIC BLOOD PRESSURE: 135 MMHG | DIASTOLIC BLOOD PRESSURE: 81 MMHG | HEART RATE: 61 BPM | HEIGHT: 62 IN | WEIGHT: 231.93 LBS | RESPIRATION RATE: 16 BRPM | TEMPERATURE: 98 F

## 2023-12-18 DIAGNOSIS — Z01.818 ENCOUNTER FOR OTHER PREPROCEDURAL EXAMINATION: ICD-10-CM

## 2023-12-18 DIAGNOSIS — N84.0 POLYP OF CORPUS UTERI: ICD-10-CM

## 2023-12-18 DIAGNOSIS — I82.409 ACUTE EMBOLISM AND THROMBOSIS OF UNSPECIFIED DEEP VEINS OF UNSPECIFIED LOWER EXTREMITY: ICD-10-CM

## 2023-12-18 DIAGNOSIS — E11.9 TYPE 2 DIABETES MELLITUS WITHOUT COMPLICATIONS: ICD-10-CM

## 2023-12-18 PROCEDURE — 80048 BASIC METABOLIC PNL TOTAL CA: CPT

## 2023-12-18 PROCEDURE — G0463: CPT

## 2023-12-18 PROCEDURE — 85027 COMPLETE CBC AUTOMATED: CPT

## 2023-12-18 PROCEDURE — T1013: CPT

## 2023-12-18 PROCEDURE — 83036 HEMOGLOBIN GLYCOSYLATED A1C: CPT

## 2023-12-18 RX ORDER — LIDOCAINE HCL 20 MG/ML
0.2 VIAL (ML) INJECTION ONCE
Refills: 0 | Status: DISCONTINUED | OUTPATIENT
Start: 2024-01-08 | End: 2024-01-08

## 2023-12-18 RX ORDER — SODIUM CHLORIDE 9 MG/ML
3 INJECTION INTRAMUSCULAR; INTRAVENOUS; SUBCUTANEOUS EVERY 8 HOURS
Refills: 0 | Status: DISCONTINUED | OUTPATIENT
Start: 2024-01-08 | End: 2024-01-08

## 2023-12-18 NOTE — H&P PST ADULT - NSICDXPASTMEDICALHX_GEN_ALL_CORE_FT
PAST MEDICAL HISTORY:  Acid reflux     Benign hypertension     Deep vein thrombosis (DVT)     Hyperlipemia     Language barrier     Obesity     Polyp of corpus uteri     Type 2 diabetes mellitus     Uses Divehi as primary spoken language      PAST MEDICAL HISTORY:  Acid reflux     Benign hypertension     Deep vein thrombosis (DVT)     Hyperlipemia     Language barrier     Obesity     Polyp of corpus uteri     Type 2 diabetes mellitus     Uses Greenlandic as primary spoken language      PAST MEDICAL HISTORY:  Acid reflux     Benign hypertension     Deep vein thrombosis (DVT)     Hyperlipemia     Language barrier     Obesity     Pericardial effusion     Polyp of corpus uteri     Type 2 diabetes mellitus     Uses Tamazight as primary spoken language      PAST MEDICAL HISTORY:  Acid reflux     Benign hypertension     Deep vein thrombosis (DVT)     Hyperlipemia     Language barrier     Obesity     Pericardial effusion     Polyp of corpus uteri     Type 2 diabetes mellitus     Uses Ukrainian as primary spoken language

## 2023-12-18 NOTE — H&P PST ADULT - HISTORY OF PRESENT ILLNESS
58 yr old Mexican Speaking  female with hx of Diabetes Mellitus type 2 (oral meds) pericardial effusion (drained in IR 2013) ,DVT on Eliquis, Obesity, and HTN  noted post menopausal bleeding. Work up by GYN dx with polyp for surgery.    Diabetes  Metformin  last 1/7  fs on arrival     DVT  Eliquis last dose 1/5 58 yr old Icelandic Speaking  female with hx of Diabetes Mellitus type 2 (oral meds) pericardial effusion (drained in IR 2013) ,DVT on Eliquis, Obesity, and HTN  noted post menopausal bleeding. Work up by GYN dx with polyp for surgery.    Diabetes  Metformin  last 1/7  fs on arrival     DVT  Eliquis last dose 1/5

## 2023-12-18 NOTE — H&P PST ADULT - HISTORY OF COVID-19 VACCINATION
What Type Of Note Output Would You Prefer (Optional)?: Standard Output Hpi Title: Evaluation of Skin Lesions Yes

## 2023-12-18 NOTE — H&P PST ADULT - NSICDXPROCEDURE_GEN_ALL_CORE_FT
PROCEDURES:  Dilation and curettage of uterus with polypectomy 18-Dec-2023 07:48:44  Annemarie Dewitt

## 2023-12-18 NOTE — H&P PST ADULT - ASSESSMENT
DASI: walk housework Mets 6  Symptoms : Denies SOB, BOWMAN, palpitations  Airway : no airway abnormalities , denies prior anesthesia complications   Mallampati : 3  Denies loose teeth     Corneal abrasion risk : Denies

## 2023-12-18 NOTE — H&P PST ADULT - ATTENDING COMMENTS
Yes, see me with testing done Attending Note    Pt seen today, PATRICK-P, discussed plan for dx hysteroscopy d&c, polypectomy. Reviewed risks of procedure including but not limited to VTE, SSI, damage to bowel bladder ureter thermal damage need for laparotomy, uterine perforation, fluid overload, risk of malignancy. Understands learners are part of her case performing EUA. Reviewed recovery. Reviewed to re-start eliquis tmrw AM. All questions answered    Delmi Walker MD

## 2023-12-28 ENCOUNTER — OUTPATIENT (OUTPATIENT)
Dept: OUTPATIENT SERVICES | Facility: HOSPITAL | Age: 58
LOS: 1 days | End: 2023-12-28
Payer: SELF-PAY

## 2023-12-28 ENCOUNTER — APPOINTMENT (OUTPATIENT)
Dept: INTERNAL MEDICINE | Facility: CLINIC | Age: 58
End: 2023-12-28
Payer: COMMERCIAL

## 2023-12-28 VITALS
BODY MASS INDEX: 44.37 KG/M2 | WEIGHT: 235 LBS | DIASTOLIC BLOOD PRESSURE: 60 MMHG | SYSTOLIC BLOOD PRESSURE: 110 MMHG | HEIGHT: 61 IN | HEART RATE: 67 BPM | OXYGEN SATURATION: 93 %

## 2023-12-28 DIAGNOSIS — I10 ESSENTIAL (PRIMARY) HYPERTENSION: ICD-10-CM

## 2023-12-28 DIAGNOSIS — E11.9 TYPE 2 DIABETES MELLITUS W/OUT COMPLICATIONS: ICD-10-CM

## 2023-12-28 DIAGNOSIS — E78.5 HYPERLIPIDEMIA, UNSPECIFIED: ICD-10-CM

## 2023-12-28 PROCEDURE — G0463: CPT

## 2023-12-28 PROCEDURE — 99213 OFFICE O/P EST LOW 20 MIN: CPT

## 2023-12-28 RX ORDER — ASPIRIN 81 MG/1
81 TABLET ORAL DAILY
Qty: 30 | Refills: 3 | Status: DISCONTINUED | COMMUNITY
End: 2023-12-28

## 2023-12-28 RX ORDER — TERBINAFINE HYDROCHLORIDE 1 G/100G
1 CREAM TOPICAL 3 TIMES DAILY
Qty: 1 | Refills: 0 | Status: DISCONTINUED | COMMUNITY
Start: 2022-05-11 | End: 2023-12-28

## 2023-12-28 RX ORDER — INFLUENZA A VIRUS A/CALIFORNIA/7/2009 X-179A (H1N1) ANTIGEN (FORMALDEHYDE INACTIVATED), INFLUENZA A VIRUS A/HONG KONG/4801/2014 X-263B (H3N2) ANTIGEN (FORMALDEHYDE INACTIVATED), INFLUENZA B VIRUS B/PHUKET/3073/2013 ANTIGEN (FORMALDEHYDE INACTIVATED), AND INFLUENZA B VIRUS B/BRISBANE/60/2008 ANTIGEN (FORMALDEHYDE INACTIVATED) 15; 15; 15; 15 UG/.5ML; UG/.5ML; UG/.5ML; UG/.5ML
INJECTION, SUSPENSION INTRAMUSCULAR
Qty: 1 | Refills: 0 | Status: DISCONTINUED | COMMUNITY
Start: 2021-09-17 | End: 2023-12-28

## 2023-12-29 PROBLEM — E11.9 DIABETES MELLITUS: Status: ACTIVE | Noted: 2018-03-09

## 2023-12-29 PROBLEM — E78.5 HYPERLIPIDEMIA: Status: ACTIVE | Noted: 2022-09-20

## 2023-12-29 NOTE — HISTORY OF PRESENT ILLNESS
[No Pertinent Pulmonary History] : no history of asthma, COPD, sleep apnea, or smoking [No Adverse Anesthesia Reaction] : no adverse anesthesia reaction in self or family member [Chronic Anticoagulation] : chronic anticoagulation [Diabetes] : diabetes [(Patient denies any chest pain, claudication, dyspnea on exertion, orthopnea, palpitations or syncope)] : Patient denies any chest pain, claudication, dyspnea on exertion, orthopnea, palpitations or syncope [Excellent (>10 METs)] : Excellent (>10 METs) [Anti-Platelet Agents: _____] : Anti-Platelet Agents: [unfilled] [Aortic Stenosis] : no aortic stenosis [Atrial Fibrillation] : no atrial fibrillation [Coronary Artery Disease] : no coronary artery disease [Recent Myocardial Infarction] : no recent myocardial infarction [Implantable Device/Pacemaker] : no implantable device/pacemaker [Chronic Kidney Disease] : no chronic kidney disease [FreeTextEntry1] : D&C - Endometrial Polyp [FreeTextEntry2] : 1/8/23 [FreeTextEntry3] : REESE [FreeTextEntry4] : 54F PMHx of NIDDM (A1c 6.6), GERD, and pericardial effusion/tamponade of unknown etiology s/p IR-guided drainage (Dec 2013), unprovoked DVT (RLE in 7/2019 - completed eliquis x1 year, LLE in 2021 - currently on Eliquis), presents for pre-op visit.  Pt has upcoming visit with gynecology after repeat sonohysterogram 9/20/23: Uterus 7.8x4.5x4.9cm. Endometrium replaced by large polyp measuring 2.9x1.5x1.6cm (previously 1.8x0.5cm. Planned for D&C Hysteroscopy w polypectomy.  Pt denies any current or recent medical complaints - denies any fever/chills, CP/SOB, peripheral edema, vision changes, n/v/d, or any other issues. Pt has been taking her medications regularly as prescribed. Pre-op labs reviewed, wnl. Recent cardiology evaluation 3 weeks ago with EKG, pt was cleared for procedure. [FreeTextEntry7] : TTE (6/2022: EF 64%, no valvular abnormalities, no pericardial effusion, borderline elevation in R sided pressures with RVSP 35 EKG (11/2023): NSR 65, QTc 405, borderline low voltage, no ST changes or TWI, no conduction delays

## 2023-12-29 NOTE — PHYSICAL EXAM
[No Acute Distress] : no acute distress [Well Nourished] : well nourished [Well Developed] : well developed [Well-Appearing] : well-appearing [Normal Sclera/Conjunctiva] : normal sclera/conjunctiva [PERRL] : pupils equal round and reactive to light [EOMI] : extraocular movements intact [Normal Outer Ear/Nose] : the outer ears and nose were normal in appearance [Normal Oropharynx] : the oropharynx was normal [No JVD] : no jugular venous distention [No Lymphadenopathy] : no lymphadenopathy [Supple] : supple [Thyroid Normal, No Nodules] : the thyroid was normal and there were no nodules present [No Respiratory Distress] : no respiratory distress  [No Accessory Muscle Use] : no accessory muscle use [Clear to Auscultation] : lungs were clear to auscultation bilaterally [Normal Rate] : normal rate  [Regular Rhythm] : with a regular rhythm [Normal S1, S2] : normal S1 and S2 [No Murmur] : no murmur heard [No Carotid Bruits] : no carotid bruits [No Abdominal Bruit] : a ~M bruit was not heard ~T in the abdomen [No Varicosities] : no varicosities [Pedal Pulses Present] : the pedal pulses are present [No Edema] : there was no peripheral edema [No Palpable Aorta] : no palpable aorta [No Extremity Clubbing/Cyanosis] : no extremity clubbing/cyanosis [Soft] : abdomen soft [Non Tender] : non-tender [Non-distended] : non-distended [No Masses] : no abdominal mass palpated [No HSM] : no HSM [Normal Bowel Sounds] : normal bowel sounds [Normal Posterior Cervical Nodes] : no posterior cervical lymphadenopathy [Normal Anterior Cervical Nodes] : no anterior cervical lymphadenopathy [No CVA Tenderness] : no CVA  tenderness [No Spinal Tenderness] : no spinal tenderness [No Joint Swelling] : no joint swelling [Grossly Normal Strength/Tone] : grossly normal strength/tone [No Rash] : no rash [Coordination Grossly Intact] : coordination grossly intact [No Focal Deficits] : no focal deficits [Normal Gait] : normal gait [Normal Affect] : the affect was normal [Normal Insight/Judgement] : insight and judgment were intact [de-identified] : obese

## 2023-12-29 NOTE — ASSESSMENT
[High Risk Surgery - Intraperitoneal, Intrathoracic or Supringuinal Vascular Procedures] : High Risk Surgery - Intraperitoneal, Intrathoracic or Supringuinal Vascular Procedures - No (0) [Ischemic Heart Disease] : Ischemic Heart Disease - No (0) [Congestive Heart Failure] : Congestive Heart Failure - No (0) [Prior Cerebrovascular Accident or TIA] : Prior Cerebrovascular Accident or TIA - No (0) [Creatinine >= 2mg/dL (1 Point)] : Creatinine >= 2mg/dL - No (0) [Insulin-dependent Diabetic (1 Point)] : Insulin-dependent Diabetic - No (0) [0] : 0 , RCRI Class: I, Risk of Post-Op Cardiac Complications: 3.9%, 95% CI for Risk Estimate: 2.8% - 5.4% [Patient Optimized for Surgery] : Patient optimized for surgery [No Further Testing Recommended] : no further testing recommended [Modify anti-platelet treatment prior to procedure] : Modify anti-platelet treatment prior to procedure [FreeTextEntry4] : Patient is medically optimized for procedure. [FreeTextEntry6] : hold eliquis day prior to surgery and day of surgery, resume day after (1/9)

## 2023-12-29 NOTE — PLAN
[FreeTextEntry1] :  #Unprovoked DVT x2 - Eliquis 5mg BID: hold day prior to surgery and day of surgery, resume day after (1/9/23) - Heme referral made re further workup (prior hypercoag workup negative) vs c/w lifelong eliquis  #DM2 - Metformin 500mg BID >> continue - Lisinopril 2.5mg qd (started for elevated microalbumin in past) >> hold day prior to surgery, resume day after  #HLD - C/w atorvastatin 20mg qhs

## 2023-12-30 PROBLEM — I31.39 OTHER PERICARDIAL EFFUSION (NONINFLAMMATORY): Chronic | Status: ACTIVE | Noted: 2023-12-18

## 2023-12-30 PROBLEM — Z78.9 OTHER SPECIFIED HEALTH STATUS: Chronic | Status: ACTIVE | Noted: 2023-12-18

## 2023-12-30 PROBLEM — N84.0 POLYP OF CORPUS UTERI: Chronic | Status: ACTIVE | Noted: 2023-12-18

## 2023-12-30 PROBLEM — I82.409 ACUTE EMBOLISM AND THROMBOSIS OF UNSPECIFIED DEEP VEINS OF UNSPECIFIED LOWER EXTREMITY: Chronic | Status: ACTIVE | Noted: 2023-12-18

## 2023-12-30 PROBLEM — I10 ESSENTIAL (PRIMARY) HYPERTENSION: Chronic | Status: ACTIVE | Noted: 2023-12-18

## 2023-12-30 PROBLEM — E11.9 TYPE 2 DIABETES MELLITUS WITHOUT COMPLICATIONS: Chronic | Status: ACTIVE | Noted: 2023-12-18

## 2023-12-30 PROBLEM — E78.5 HYPERLIPIDEMIA, UNSPECIFIED: Chronic | Status: ACTIVE | Noted: 2023-12-18

## 2024-01-04 DIAGNOSIS — E78.5 HYPERLIPIDEMIA, UNSPECIFIED: ICD-10-CM

## 2024-01-04 DIAGNOSIS — E11.9 TYPE 2 DIABETES MELLITUS WITHOUT COMPLICATIONS: ICD-10-CM

## 2024-01-07 ENCOUNTER — TRANSCRIPTION ENCOUNTER (OUTPATIENT)
Age: 59
End: 2024-01-07

## 2024-01-08 ENCOUNTER — APPOINTMENT (OUTPATIENT)
Dept: OBGYN | Facility: HOSPITAL | Age: 59
End: 2024-01-08

## 2024-01-08 ENCOUNTER — TRANSCRIPTION ENCOUNTER (OUTPATIENT)
Age: 59
End: 2024-01-08

## 2024-01-08 ENCOUNTER — RESULT REVIEW (OUTPATIENT)
Age: 59
End: 2024-01-08

## 2024-01-08 ENCOUNTER — OUTPATIENT (OUTPATIENT)
Dept: OUTPATIENT SERVICES | Facility: HOSPITAL | Age: 59
LOS: 1 days | End: 2024-01-08
Payer: SELF-PAY

## 2024-01-08 VITALS
SYSTOLIC BLOOD PRESSURE: 126 MMHG | WEIGHT: 293 LBS | RESPIRATION RATE: 20 BRPM | TEMPERATURE: 98 F | OXYGEN SATURATION: 95 % | HEIGHT: 55 IN | DIASTOLIC BLOOD PRESSURE: 77 MMHG | HEART RATE: 65 BPM

## 2024-01-08 VITALS — HEART RATE: 94 BPM | OXYGEN SATURATION: 92 % | RESPIRATION RATE: 16 BRPM

## 2024-01-08 DIAGNOSIS — N84.0 POLYP OF CORPUS UTERI: ICD-10-CM

## 2024-01-08 DIAGNOSIS — Z01.818 ENCOUNTER FOR OTHER PREPROCEDURAL EXAMINATION: ICD-10-CM

## 2024-01-08 LAB
GLUCOSE BLDC GLUCOMTR-MCNC: 102 MG/DL — HIGH (ref 70–99)
GLUCOSE BLDC GLUCOMTR-MCNC: 102 MG/DL — HIGH (ref 70–99)
GLUCOSE BLDC GLUCOMTR-MCNC: 87 MG/DL — SIGNIFICANT CHANGE UP (ref 70–99)
GLUCOSE BLDC GLUCOMTR-MCNC: 87 MG/DL — SIGNIFICANT CHANGE UP (ref 70–99)

## 2024-01-08 PROCEDURE — 88305 TISSUE EXAM BY PATHOLOGIST: CPT | Mod: 26

## 2024-01-08 PROCEDURE — 82962 GLUCOSE BLOOD TEST: CPT

## 2024-01-08 PROCEDURE — 58558 HYSTEROSCOPY BIOPSY: CPT

## 2024-01-08 PROCEDURE — 88305 TISSUE EXAM BY PATHOLOGIST: CPT

## 2024-01-08 PROCEDURE — C9399: CPT

## 2024-01-08 RX ORDER — ASPIRIN/CALCIUM CARB/MAGNESIUM 324 MG
1 TABLET ORAL
Refills: 0 | DISCHARGE

## 2024-01-08 RX ORDER — IBUPROFEN 200 MG
3 TABLET ORAL
Qty: 0 | Refills: 0 | DISCHARGE

## 2024-01-08 RX ORDER — HYDROMORPHONE HYDROCHLORIDE 2 MG/ML
0.25 INJECTION INTRAMUSCULAR; INTRAVENOUS; SUBCUTANEOUS
Refills: 0 | Status: DISCONTINUED | OUTPATIENT
Start: 2024-01-08 | End: 2024-01-08

## 2024-01-08 RX ORDER — LISINOPRIL 2.5 MG/1
0.5 TABLET ORAL
Refills: 0 | DISCHARGE

## 2024-01-08 RX ORDER — ATORVASTATIN CALCIUM 80 MG/1
1 TABLET, FILM COATED ORAL
Refills: 0 | DISCHARGE

## 2024-01-08 RX ORDER — ONDANSETRON 8 MG/1
4 TABLET, FILM COATED ORAL ONCE
Refills: 0 | Status: DISCONTINUED | OUTPATIENT
Start: 2024-01-08 | End: 2024-01-08

## 2024-01-08 RX ORDER — SODIUM CHLORIDE 9 MG/ML
1000 INJECTION, SOLUTION INTRAVENOUS
Refills: 0 | Status: DISCONTINUED | OUTPATIENT
Start: 2024-01-08 | End: 2024-01-23

## 2024-01-08 RX ORDER — ACETAMINOPHEN 500 MG
3 TABLET ORAL
Qty: 0 | Refills: 0 | DISCHARGE

## 2024-01-08 RX ORDER — METFORMIN HYDROCHLORIDE 850 MG/1
1 TABLET ORAL
Refills: 0 | DISCHARGE

## 2024-01-08 RX ORDER — APIXABAN 2.5 MG/1
1 TABLET, FILM COATED ORAL
Refills: 0 | DISCHARGE

## 2024-01-08 NOTE — BRIEF OPERATIVE NOTE - NSICDXBRIEFPROCEDURE_GEN_ALL_CORE_FT
PROCEDURES:  Diagnostic hysteroscopy for abnormal uterine bleeding 08-Jan-2024 18:27:45  Jereen, Amyeo  Hysteroscopic polypectomy using MyoSure tissue removal system 08-Jan-2024 18:27:53  Jereen, Amyeo  Dilation and curettage with polypectomy 08-Jan-2024 18:27:59  Jereen, Amyeo

## 2024-01-08 NOTE — ASU DISCHARGE PLAN (ADULT/PEDIATRIC) - ASU DC SPECIAL INSTRUCTIONSFT
POSTOPERATIVE INSTRUCTIONS FOR HYSTEROSCOPY, D&C    After your surgery it is normal to experience:    •	Vaginal bleeding- can last 1-2 weeks and should not be heavier than a period. It may come and go and be red, brown or pink. Use pads, not tampons.  •	Cramping- Is common especially within the first 24 hours. This should be relieved by taking over the counter Motrin, Advil or Tylenol.  •	Watery discharge- can be seen for a day or two after hysteroscopy because some of the fluid that is put into the uterus during surgery may continue to leak out for a day or two.  •	Vaginal soreness/irritation- can occur in the first few days after surgery because of the instruments that were used in the vagina. Soreness can be treated with ice pack and irritation can be taken care of with an emollient such as Balmex or Aquaphor that you can put directly on the irritated area.    Restrictions: For 10-14 days after the surgery you should avoid the following:    •	Tampons  •	Sex  •	Vigorous gym exercise  •	Swimming  pools and tub baths  •	Wait a day or two before going back to work    Anesthesia Precautions:  For the next 12 hours do not:   •	drive a car,  •	 operate power tools or machinery,  •	drink alcohol, beer, or wine,   •	make important personal or business decisions    Diet:   •	Resume Regular diet but Progress diet slowly     Physician Notification- Warning signs to look out for  •	Heavy Vaginal Bleeding   •	Shortness of breath or chest pain  •	Severe Abdominal Pain  •	Persistent nausea and vomiting  •	Pain not relieved by medications  •	Fever greater than 100.5®F  •	Inability to tolerate liquids or foods  •	Unable to urinate after 8 hours    Discharge and Disposition  •	Discharge to home    Follow Up Care:  •	In the event that you develop a complication and you are unable to reach your own physician, you may contact:  911 or go to the nearest Emergency Room.   •	Please contact the [facility] office to make a follow up [phone] POSTOPERATIVE INSTRUCTIONS FOR HYSTEROSCOPY, D&C    After your surgery it is normal to experience:    •	Vaginal bleeding- can last 1-2 weeks and should not be heavier than a period. It may come and go and be red, brown or pink. Use pads, not tampons.  •	Cramping- Is common especially within the first 24 hours. This should be relieved by taking over the counter Motrin, Advil or Tylenol.  •	Watery discharge- can be seen for a day or two after hysteroscopy because some of the fluid that is put into the uterus during surgery may continue to leak out for a day or two.  •	Vaginal soreness/irritation- can occur in the first few days after surgery because of the instruments that were used in the vagina. Soreness can be treated with ice pack and irritation can be taken care of with an emollient such as Balmex or Aquaphor that you can put directly on the irritated area.    Restrictions: For 10-14 days after the surgery you should avoid the following:    •	Tampons  •	Sex  •	Vigorous gym exercise  •	Swimming  pools and tub baths  •	Wait a day or two before going back to work    Anesthesia Precautions:  For the next 12 hours do not:   •	drive a car,  •	 operate power tools or machinery,  •	drink alcohol, beer, or wine,   •	make important personal or business decisions    Diet:   •	Resume Regular diet but Progress diet slowly     Physician Notification- Warning signs to look out for  •	Heavy Vaginal Bleeding   •	Shortness of breath or chest pain  •	Severe Abdominal Pain  •	Persistent nausea and vomiting  •	Pain not relieved by medications  •	Fever greater than 100.5®F  •	Inability to tolerate liquids or foods  •	Unable to urinate after 8 hours    Discharge and Disposition  •	Discharge to home    Follow Up Care:  •	In the event that you develop a complication and you are unable to reach your own physician, you may contact:  911 or go to the nearest Emergency Room.   •	Please contact the [facility] office to make a follow up [phone] POSTOPERATIVE INSTRUCTIONS FOR HYSTEROSCOPY, D&C    After your surgery it is normal to experience:    •	Vaginal bleeding- can last 1-2 weeks and should not be heavier than a period. It may come and go and be red, brown or pink. Use pads, not tampons.  •	Cramping- Is common especially within the first 24 hours. This should be relieved by taking over the counter Motrin, Advil or Tylenol.  •	Watery discharge- can be seen for a day or two after hysteroscopy because some of the fluid that is put into the uterus during surgery may continue to leak out for a day or two.  •	Vaginal soreness/irritation- can occur in the first few days after surgery because of the instruments that were used in the vagina. Soreness can be treated with ice pack and irritation can be taken care of with an emollient such as Balmex or Aquaphor that you can put directly on the irritated area.    Restrictions: For 10-14 days after the surgery you should avoid the following:    •	Tampons  •	Sex  •	Vigorous gym exercise  •	Swimming  pools and tub baths  •	Wait a day or two before going back to work    Anesthesia Precautions:  For the next 12 hours do not:   •	drive a car,  •	 operate power tools or machinery,  •	drink alcohol, beer, or wine,   •	make important personal or business decisions    Anticoagulation:  •	Please resume your routine Eliquis starting tomorrow morning.    Diet:   •	Resume Regular diet but Progress diet slowly     Physician Notification- Warning signs to look out for  •	Heavy Vaginal Bleeding   •	Shortness of breath or chest pain  •	Severe Abdominal Pain  •	Persistent nausea and vomiting  •	Pain not relieved by medications  •	Fever greater than 100.5®F  •	Inability to tolerate liquids or foods  •	Unable to urinate after 8 hours    Discharge and Disposition  •	Discharge to home    Follow Up Care:  •	In the event that you develop a complication and you are unable to reach your own physician, you may contact:  911 or go to the nearest Emergency Room.   •	Please contact the [facility] office to make a follow up appointment [phone] POSTOPERATIVE INSTRUCTIONS FOR HYSTEROSCOPY, D&C    After your surgery it is normal to experience:    •	Vaginal bleeding- can last 1-2 weeks and should not be heavier than a period. It may come and go and be red, brown or pink. Use pads, not tampons.  •	Cramping- Is common especially within the first 24 hours. This should be relieved by taking over the counter Motrin, Advil or Tylenol.  •	Watery discharge- can be seen for a day or two after hysteroscopy because some of the fluid that is put into the uterus during surgery may continue to leak out for a day or two.  •	Vaginal soreness/irritation- can occur in the first few days after surgery because of the instruments that were used in the vagina. Soreness can be treated with ice pack and irritation can be taken care of with an emollient such as Balmex or Aquaphor that you can put directly on the irritated area.    Restrictions: For 10-14 days after the surgery you should avoid the following:    •	Tampons  •	Sex  •	Vigorous gym exercise  •	Swimming  pools and tub baths  •	Wait a day or two before going back to work    Anesthesia Precautions:  For the next 12 hours do not:   •	drive a car,  •	 operate power tools or machinery,  •	drink alcohol, beer, or wine,   •	make important personal or business decisions    Anticoagulation:  •	Please resume your routine Eliquis starting tomorrow morning.    Diet:   •	Resume Regular diet but Progress diet slowly     Physician Notification- Warning signs to look out for  •	Heavy Vaginal Bleeding   •	Shortness of breath or chest pain  •	Severe Abdominal Pain  •	Persistent nausea and vomiting  •	Pain not relieved by medications  •	Fever greater than 100.5®F  •	Inability to tolerate liquids or foods  •	Unable to urinate after 8 hours    Discharge and Disposition  •	Discharge to home    Follow Up Care:  •	In the event that you develop a complication and you are unable to reach your own physician, you may contact:  911 or go to the nearest Emergency Room.   •	Please contact the [facility] office to make a follow up appointment [phone]

## 2024-01-08 NOTE — ASU DISCHARGE PLAN (ADULT/PEDIATRIC) - FOLLOW UP APPOINTMENTS
[facility], [facility], [facility]

## 2024-01-08 NOTE — ASU DISCHARGE PLAN (ADULT/PEDIATRIC) - PROVIDER TOKENS
FREE:[LAST:[[facility]],PHONE:[(   )    -],FAX:[(   )    -],ADDRESS:[[address] [place] [address] [address] [address] [phone]],FOLLOWUP:[2 weeks]] FREE:[LAST:[[facility]],PHONE:[(   )    -],FAX:[(   )    -],ADDRESS:[[address] [place] [address] [address], [place] [address] [phone]],FOLLOWUP:[2 weeks]]

## 2024-01-08 NOTE — BRIEF OPERATIVE NOTE - NSICDXBRIEFPREOP_GEN_ALL_CORE_FT
PRE-OP DIAGNOSIS:  Abnormal uterine bleeding due to endometrial polyp 08-Jan-2024 18:28:54  Jereen, Amyeo

## 2024-01-08 NOTE — ASU DISCHARGE PLAN (ADULT/PEDIATRIC) - NS MD DC FALL RISK RISK
For information on Fall & Injury Prevention, visit: [email or web address] OR  [email or web address] OR  https://www.cdc.gov/steadi/patient.html For information on Fall & Injury Prevention, visit: [email or web address] OR  [email or web address] OR  https://www.cdc.gov/steadi/patient.html

## 2024-01-08 NOTE — ASU PATIENT PROFILE, ADULT - FALL HARM RISK - PT AGE POPULATION HIDDEN
Adult Stelara Counseling:  I discussed with the patient the risks of ustekinumab including but not limited to immunosuppression, malignancy, posterior leukoencephalopathy syndrome, and serious infections.  The patient understands that monitoring is required including a PPD at baseline and must alert us or the primary physician if symptoms of infection or other concerning signs are noted.

## 2024-01-08 NOTE — BRIEF OPERATIVE NOTE - OPERATION/FINDINGS
EUA small mobile uterus, adnexa nonpalpable due to body habitus  HSC atrophic endometrium, bilateral os identified, long free flowing polyp off of fundus visualized and resected with Myosure. Remaining removed with polyp forceps  D&C Endometrial tissue collected and send to pathology

## 2024-01-08 NOTE — ASU PATIENT PROFILE, ADULT - NSICDXPASTMEDICALHX_GEN_ALL_CORE_FT
PAST MEDICAL HISTORY:  Acid reflux     Benign hypertension     Deep vein thrombosis (DVT)     Hyperlipemia     Language barrier     Obesity     Pericardial effusion     Polyp of corpus uteri     Type 2 diabetes mellitus     Uses Mohawk as primary spoken language      PAST MEDICAL HISTORY:  Acid reflux     Benign hypertension     Deep vein thrombosis (DVT)     Hyperlipemia     Language barrier     Obesity     Pericardial effusion     Polyp of corpus uteri     Type 2 diabetes mellitus     Uses Indonesian as primary spoken language

## 2024-01-08 NOTE — ASU DISCHARGE PLAN (ADULT/PEDIATRIC) - MEDICATION INSTRUCTIONS
Next dose of TYLENOL may be taken at  _____________ PM if needed. DO NOT take any additional products containing TYLENOL or ACETAMINOPHEN, such as VICODIN, PERCOCET, NORCO, EXCEDRIN, and any over-the-counter cold medications until this time. DO NOT CONSUME MORE THAN 2432-1518 MG of TYLENOL (acetaminophen) in a 24-hour period. Next dose of TYLENOL may be taken at  _____________ PM if needed. DO NOT take any additional products containing TYLENOL or ACETAMINOPHEN, such as VICODIN, PERCOCET, NORCO, EXCEDRIN, and any over-the-counter cold medications until this time. DO NOT CONSUME MORE THAN 0318-5613 MG of TYLENOL (acetaminophen) in a 24-hour period.

## 2024-01-08 NOTE — ASU DISCHARGE PLAN (ADULT/PEDIATRIC) - CARE PROVIDER_API CALL
[facility],   [address] [place] [address] [address], [place] [address] [phone]  Phone: (   )    -  Fax: (   )    -  Follow Up Time: 2 weeks   [facility],   [address] [place] [address] [address], [place] [address] [phone]  Phone: (   )    -  Fax: (   )    -  Follow Up Time: 2 weeks

## 2024-01-08 NOTE — BRIEF OPERATIVE NOTE - ASSISTANT(S)
Tona Villanueva MD FMIGS-3, Amyeo Jereen, MD PGY-3 Tona Villanueva MD FMIGS-2 PGY-6, Amyeo Jereen, MD PGY-3

## 2024-01-08 NOTE — ASU PATIENT PROFILE, ADULT - FALL HARM RISK - UNIVERSAL INTERVENTIONS
Bed in lowest position, wheels locked, appropriate side rails in place/Call bell, personal items and telephone in reach/Instruct patient to call for assistance before getting out of bed or chair/Non-slip footwear when patient is out of bed/[place] to call system/Physically safe environment - no spills, clutter or unnecessary equipment/Purposeful Proactive Rounding/Room/bathroom lighting operational, light cord in reach Bed in lowest position, wheels locked, appropriate side rails in place/Call bell, personal items and telephone in reach/Instruct patient to call for assistance before getting out of bed or chair/Non-slip footwear when patient is out of bed/[place] to call system/Physically safe environment - no spills, clutter or unnecessary equipment/Purposeful Proactive Rounding/Room/bathroom lighting operational, light cord in reach

## 2024-01-08 NOTE — BRIEF OPERATIVE NOTE - NSICDXBRIEFPOSTOP_GEN_ALL_CORE_FT
POST-OP DIAGNOSIS:  Abnormal uterine bleeding due to endometrial polyp 08-Jan-2024 18:28:59  Jereen, Amyeo

## 2024-01-08 NOTE — CHART NOTE - NSCHARTNOTEFT_GEN_A_CORE
POST-OP NOTE    JUAN FRANCISCO QUINTERO | [id number] | [facility] PACU 29    Procedure: s/p D&C HSC Polypectomy    Subjective: Pt doing well, ambulating, tolerating PO, voiding. Patient denies chest pain, shortness of breath, fevers, chills, nausea, vomiting, diarrhea.      Vital Signs Last 24 Hrs  T(C): 36.3 (08 Jan 2024 18:00), Max: 36.7 (08 Jan 2024 13:48)  T(F): 97.3 (08 Jan 2024 18:00), Max: 98.1 (08 Jan 2024 13:48)  HR: 83 (08 Jan 2024 18:30) (65 - 95)  BP: 108/76 (08 Jan 2024 18:30) (104/59 - 126/77)  BP(mean): 86 (08 Jan 2024 18:30) (78 - 86)  RR: 16 (08 Jan 2024 18:30) (13 - 20)  SpO2: 98% (08 Jan 2024 18:30) (94% - 100%)    Parameters below as of 08 Jan 2024 18:30  Patient On (Oxygen Delivery Method): room air      I&O's Summary    08 Jan 2024 07:01  -  08 Jan 2024 18:57  --------------------------------------------------------  IN: 0 mL / OUT: 50 mL / NET: -50 mL                 PHYSICAL EXAM:  Gen: NAD  Head: Normocephalic, atraumatic  Resp: breathing easily, no stridor  CV: RRR  Abdomen: soft, nontender, non-distended  : spotting noted on pad, minimal  Skin: Incision c/d/i. Normal color/turgor, no rashes or lesions      A/P: 57yo w/ h/o PMB, SHG with polyp, s/p D&C Hysteroscopic Polypectomy under GETA, EBL 5cc, patient meeting all post-operative milestones.    - f/u surg pathology   - will see in clinic for post-op check  - discussed resuming Eliquis and Lisinopril tomorrow POD2 per plan  - Cleared for discharge    Amyeo Afroz Jereen, PGY-3  d/w Dr Aaron MD [facility] [place]ending POST-OP NOTE    JUAN FRANCISCO QUINTERO | [id number] | [facility] PACU 29    Procedure: s/p D&C HSC Polypectomy    Subjective: Pt doing well, ambulating, tolerating PO, voiding. Patient denies chest pain, shortness of breath, fevers, chills, nausea, vomiting, diarrhea.      Vital Signs Last 24 Hrs  T(C): 36.3 (08 Jan 2024 18:00), Max: 36.7 (08 Jan 2024 13:48)  T(F): 97.3 (08 Jan 2024 18:00), Max: 98.1 (08 Jan 2024 13:48)  HR: 83 (08 Jan 2024 18:30) (65 - 95)  BP: 108/76 (08 Jan 2024 18:30) (104/59 - 126/77)  BP(mean): 86 (08 Jan 2024 18:30) (78 - 86)  RR: 16 (08 Jan 2024 18:30) (13 - 20)  SpO2: 98% (08 Jan 2024 18:30) (94% - 100%)    Parameters below as of 08 Jan 2024 18:30  Patient On (Oxygen Delivery Method): room air      I&O's Summary    08 Jan 2024 07:01  -  08 Jan 2024 18:57  --------------------------------------------------------  IN: 0 mL / OUT: 50 mL / NET: -50 mL                 PHYSICAL EXAM:  Gen: NAD  Head: Normocephalic, atraumatic  Resp: breathing easily, no stridor  CV: RRR  Abdomen: soft, nontender, non-distended  : spotting noted on pad, minimal  Skin: Incision c/d/i. Normal color/turgor, no rashes or lesions      A/P: 59yo w/ h/o PMB, SHG with polyp, s/p D&C Hysteroscopic Polypectomy under GETA, EBL 5cc, patient meeting all post-operative milestones.    - f/u surg pathology   - will see in clinic for post-op check  - discussed resuming Eliquis and Lisinopril tomorrow POD2 per plan  - Cleared for discharge    Amyeo Afroz Jereen, PGY-3  d/w Dr Aaron [place] [facility] [place]

## 2024-01-11 LAB
SURGICAL PATHOLOGY STUDY: SIGNIFICANT CHANGE UP
SURGICAL PATHOLOGY STUDY: SIGNIFICANT CHANGE UP

## 2024-01-25 ENCOUNTER — RESULT REVIEW (OUTPATIENT)
Age: 59
End: 2024-01-25

## 2024-01-25 ENCOUNTER — OUTPATIENT (OUTPATIENT)
Dept: OUTPATIENT SERVICES | Facility: HOSPITAL | Age: 59
LOS: 1 days | End: 2024-01-25
Payer: SELF-PAY

## 2024-01-25 ENCOUNTER — APPOINTMENT (OUTPATIENT)
Dept: OBGYN | Facility: CLINIC | Age: 59
End: 2024-01-25
Payer: COMMERCIAL

## 2024-01-25 DIAGNOSIS — N84.0 POLYP OF CORPUS UTERI: ICD-10-CM

## 2024-01-25 DIAGNOSIS — N76.0 ACUTE VAGINITIS: ICD-10-CM

## 2024-01-25 PROCEDURE — 99213 OFFICE O/P EST LOW 20 MIN: CPT

## 2024-01-25 PROCEDURE — G0463: CPT

## 2024-01-26 PROBLEM — N84.0 ENDOMETRIAL POLYP: Status: ACTIVE | Noted: 2020-09-09

## 2024-01-26 NOTE — REASON FOR VISIT
[Post Op Day: ___] : Post-Op Day:  #[unfilled] [Indication: ___] : Indication: [unfilled] [Procedure: ___] : Procedure: [unfilled]

## 2024-01-28 NOTE — HISTORY OF PRESENT ILLNESS
[Pain is well-controlled] : pain is well-controlled [Pathology reviewed] : pathology reviewed [None] : no vaginal bleeding [Fever] : no fever [Chills] : no chills [Nausea] : no nausea [Vomiting] : no vomiting [Diarrhea] : no diarrhea [Vaginal Bleeding] : no vaginal bleeding [Pelvic Pressure] : no pelvic pressure [Dysuria] : no dysuria [Vaginal Discharge] : no vaginal discharge [Constipation] : no constipation [de-identified] : 19 [de-identified] : Hysteroscopy D+C [de-identified] : postmenopausal bleeding/ Endometrial Polyp [de-identified] : abdominal exam benign, soft, non-tender throughout

## 2024-01-28 NOTE — ASSESSMENT
[Doing Well] : is doing well [No Sign of Infection] : is showing no signs of infection [Excellent Pain Control] : has excellent pain control [de-identified] : 59 y/o  (LMP ) w/ hx of T2DM, Obesity, HLD, Percardial effusion, and DVT now s/p dilation and curretage with hysteroscopic polypectomy after presenting with postmenopausal bleeding. The patient denies any adverse symptoms after her surgery and denies any vaginal bleeding or abdominal pain. Patient can resume all activities. Pathology result was inactive endometrium and endometrial polyp. Patient informed of result.

## 2024-01-28 NOTE — PLAN
[FreeTextEntry1] : -Continue HCM with PCP for other co-morbidities.  -Return to radiology in March 2024 for repeat Mammo and Breast US as indicated by BIRADS 3 reading during screening US in September 2023. Pap smear is UTD as of 8/2023. Patient instructed to follow up for GYN annual appointment in 8/2023 or sooner if any issues arise.  -Patient to return to regular GYN clinic  Patient seen with Dr. Villanueva and d/w Dr. Walker.   [facility] [place]  Ms. Jiménez return for postop visit after hysteroscopic polypectomy. She is feeling and denies issues. Discussed benign findings and no need for additional surveillance unless she is again symptomatic. Advised to follow up her BIRADS-3 mammogram and continue with routine GYN care. All questions answered to her satisfaction.  ANABEL Villanueva FMIGS-2/PGY-6

## 2024-01-29 DIAGNOSIS — N84.0 POLYP OF CORPUS UTERI: ICD-10-CM

## 2024-04-02 ENCOUNTER — APPOINTMENT (OUTPATIENT)
Dept: ULTRASOUND IMAGING | Facility: IMAGING CENTER | Age: 59
End: 2024-04-02
Payer: COMMERCIAL

## 2024-04-02 ENCOUNTER — RESULT REVIEW (OUTPATIENT)
Age: 59
End: 2024-04-02

## 2024-04-02 ENCOUNTER — APPOINTMENT (OUTPATIENT)
Dept: MAMMOGRAPHY | Facility: IMAGING CENTER | Age: 59
End: 2024-04-02
Payer: COMMERCIAL

## 2024-04-02 ENCOUNTER — OUTPATIENT (OUTPATIENT)
Dept: OUTPATIENT SERVICES | Facility: HOSPITAL | Age: 59
LOS: 1 days | End: 2024-04-02
Payer: SELF-PAY

## 2024-04-02 DIAGNOSIS — Z00.00 ENCOUNTER FOR GENERAL ADULT MEDICAL EXAMINATION WITHOUT ABNORMAL FINDINGS: ICD-10-CM

## 2024-04-02 PROCEDURE — 76642 ULTRASOUND BREAST LIMITED: CPT

## 2024-04-02 PROCEDURE — G0279: CPT | Mod: 26

## 2024-04-02 PROCEDURE — 76641 ULTRASOUND BREAST COMPLETE: CPT

## 2024-04-02 PROCEDURE — 76642 ULTRASOUND BREAST LIMITED: CPT | Mod: 26,RT

## 2024-04-02 PROCEDURE — G0279: CPT

## 2024-04-02 PROCEDURE — 77065 DX MAMMO INCL CAD UNI: CPT | Mod: 26,RT

## 2024-04-02 PROCEDURE — 77065 DX MAMMO INCL CAD UNI: CPT

## 2024-04-08 NOTE — ED PROVIDER NOTE - DISPOSITION TYPE
Progress Note - Vascular Surgery   Sourav Saul 61 y.o. male MRN: [id number]  Unit/Bed#: [facility] 516-01 Encounter: [id number]    Assessment:  Sourav Saul is a 61 y.o. male hx of TBI noted to have 7.5cm AAA and B LINDA aneurysms.      4/5 - Open aortoiliac aneurysm repair with 18x9mm dacron bifurcated graft (vacqr-ac-rucwm repair w/ R-EIA, L-LINDA), suprarenal clamp time 23min     Plan:  Neuro - post-operative pain, baseline paresthesias from TBI   Resolved - RLE numbness and Left hand numbness/paresthesias   CT head negative   Neurology input appreciated  No evidence of SCI   Pain control w/ Ropivacaine/Fentanyl PCEA   Prn breakthrough   APS input appreciated   Epidural removal today per APS   Continue frequent neurovascular checks   CV - AAA, b/l LINDA s/p OAR   Goal MAP > 65   S/p 8L crystalloid, 1L cell-saver, 1L albumin intra-op   Neurovascular checks   HTN - maintain SBP<180   IV lopressor w/ hydralazine/labetalol prn   Transition to PO Troprol 25mg    Aspirin/statin  Monitor for fluid overload   Pulm   Monitor pulse ox  Supplemental O2 as needed, currently on RA   Encourage IS   GI - post-op ileus, monitor for colonic ischemia   Currently no evidence of colonic ischemia  Return of bowel function noted, non-bloody   Diet - Adv to clears   GI ppx    - baseline CKD3b   Supra-renal clamp 23 min, accessory renals off aneurysm sac without reimplantation    Trend Cr., currently elevated 2.25 (2.62)  Strict I/O   DC alfred  Heme - ABLA   Total transfusion 2u pRBC, 2u FFP   Trend H/H  Transfuse as neede for hgb < 7   DVT ppx   ID   Abx: none  Leukocytosis improving  Endo   Glucose control   MSK  Encourage OOB/ambulation   PT/OT   F/E/N  Lytes per ICU team   Diet - clears    Dispo - DG SD1; DC Epidural, alfred; Adv diet to clears    Subjective/Objective    S/E this AM, HTN overnight ('s) requiring PRN IV meds    without n/v, not replaced, had return of bowel function, + flatus/BM x 3 (non-bloody) yesterday.   "Paresthesias remain resolved.      Incisional pain is well controlled.  Has been OOB to chair.  No other complaints, denies f/c, HA, CP, SOB, n/v, new paresthesias, or motor/sensory dysfunction.      Objective:     Blood pressure 162/86, pulse 95, temperature 98.5 °F (36.9 °C), temperature source Oral, resp. rate 21, height 5' 10\" (1.778 m), weight 107 kg (235 lb 7.2 oz), SpO2 93%.,Body mass index is 33.78 kg/m².      Intake/Output Summary (Last 24 hours) at 4/8/2024 0617  Last data filed at 4/8/2024 0400  Gross per 24 hour   Intake 289.35 ml   Output 2430 ml   Net -2140.65 ml       Physical Exam:   GEN: NAD  HEENT: NCAT, R-IJ TLCVC   CV: RRR  Lung: Normal effort  Ab: Soft, NT/ND, midline incision C/D/I, alfred catheter in place   Extrem: 2+ fem b/l, right DP 1+, left DP 2+, motor 5/5 throughout, sensation intact, compartments soft  Neuro: A+Ox3     Lab, Imaging and other studies:I have personally reviewed pertinent lab results.     VTE Pharmacologic Prophylaxis: Heparin  VTE Mechanical Prophylaxis: sequential compression device    Recent Results (from the past 36 hour(s))   Prepare Plasma: 4 Units    Collection Time: 04/07/24 12:05 AM   Result Value Ref Range    Unit Product Code C1634R33     Unit Number [id number]     Unit ABO A     Unit RH POS     Unit Dispense Status Presumed Trans     Unit Product Volume 280 ml    Unit Product Code X6147D03     Unit Number [id number]     Unit ABO A     Unit RH POS     Unit Dispense Status Return to Inv     Unit Product Volume 280 ml    Unit Product Code H6118Y35     Unit Number [id number]     Unit ABO A     Unit RH NEG     Unit Dispense Status Return to Inv     Unit Product Volume 250 ml    Unit Product Code O7251Z56     Unit Number [id number]     Unit ABO A     Unit RH NEG     Unit Dispense Status Presumed Trans     Unit Product Volume 280 ml   Fingerstick Glucose (POCT)    Collection Time: 04/07/24 12:40 AM   Result Value Ref Range    POC Glucose 109 65 - " 140 mg/dl   Basic metabolic panel    Collection Time: 04/07/24  5:13 AM   Result Value Ref Range    Sodium 142 135 - 147 mmol/L    Potassium 3.9 3.5 - 5.3 mmol/L    Chloride 108 96 - 108 mmol/L    CO2 24 21 - 32 mmol/L    ANION GAP 10 4 - 13 mmol/L    BUN 32 (H) 5 - 25 mg/dL    Creatinine 2.61 (H) 0.60 - 1.30 mg/dL    Glucose 107 65 - 140 mg/dL    Calcium 7.8 (L) 8.4 - 10.2 mg/dL    eGFR 25 ml/min/1.73sq m   CBC    Collection Time: 04/07/24  5:13 AM   Result Value Ref Range    WBC 19.09 (H) 4.31 - 10.16 Thousand/uL    RBC 3.70 (L) 3.88 - 5.62 Million/uL    Hemoglobin 10.8 (L) 12.0 - 17.0 g/dL    Hematocrit 32.0 (L) 36.5 - 49.3 %    MCV 87 82 - 98 fL    MCH 29.2 26.8 - 34.3 pg    MCHC 33.8 31.4 - 37.4 g/dL    RDW 13.7 11.6 - 15.1 %    Platelets 144 (L) 149 - 390 Thousands/uL    MPV 10.1 8.9 - 12.7 fL   Magnesium    Collection Time: 04/07/24  5:13 AM   Result Value Ref Range    Magnesium 2.0 1.9 - 2.7 mg/dL   Fingerstick Glucose (POCT)    Collection Time: 04/07/24  6:04 AM   Result Value Ref Range    POC Glucose 104 65 - 140 mg/dl   Fingerstick Glucose (POCT)    Collection Time: 04/07/24 12:16 PM   Result Value Ref Range    POC Glucose 103 65 - 140 mg/dl   Fingerstick Glucose (POCT)    Collection Time: 04/07/24  6:14 PM   Result Value Ref Range    POC Glucose 81 65 - 140 mg/dl   Fingerstick Glucose (POCT)    Collection Time: 04/08/24 12:41 AM   Result Value Ref Range    POC Glucose 105 65 - 140 mg/dl   Basic metabolic panel    Collection Time: 04/08/24  4:44 AM   Result Value Ref Range    Sodium 141 135 - 147 mmol/L    Potassium 3.9 3.5 - 5.3 mmol/L    Chloride 109 (H) 96 - 108 mmol/L    CO2 23 21 - 32 mmol/L    ANION GAP 9 4 - 13 mmol/L    BUN 29 (H) 5 - 25 mg/dL    Creatinine 2.25 (H) 0.60 - 1.30 mg/dL    Glucose 96 65 - 140 mg/dL    Calcium 8.0 (L) 8.4 - 10.2 mg/dL    eGFR 30 ml/min/1.73sq m   Calcium, ionized    Collection Time: 04/08/24  4:44 AM   Result Value Ref Range    Calcium, Ionized 1.08 (L) 1.12 - 1.32  mmol/L   CBC    Collection Time: 04/08/24  4:44 AM   Result Value Ref Range    WBC 16.29 (H) 4.31 - 10.16 Thousand/uL    RBC 3.60 (L) 3.88 - 5.62 Million/uL    Hemoglobin 10.6 (L) 12.0 - 17.0 g/dL    Hematocrit 31.2 (L) 36.5 - 49.3 %    MCV 87 82 - 98 fL    MCH 29.4 26.8 - 34.3 pg    MCHC 34.0 31.4 - 37.4 g/dL    RDW 13.5 11.6 - 15.1 %    Platelets 159 149 - 390 Thousands/uL    MPV 10.4 8.9 - 12.7 fL   Fingerstick Glucose (POCT)    Collection Time: 04/08/24  6:13 AM   Result Value Ref Range    POC Glucose 95 65 - 140 mg/dl        DISCHARGE

## 2024-04-26 ENCOUNTER — APPOINTMENT (OUTPATIENT)
Dept: INTERNAL MEDICINE | Facility: CLINIC | Age: 59
End: 2024-04-26

## 2024-04-26 ENCOUNTER — OUTPATIENT (OUTPATIENT)
Dept: OUTPATIENT SERVICES | Facility: HOSPITAL | Age: 59
LOS: 1 days | End: 2024-04-26

## 2024-04-26 ENCOUNTER — RESULT REVIEW (OUTPATIENT)
Age: 59
End: 2024-04-26

## 2024-04-26 VITALS
SYSTOLIC BLOOD PRESSURE: 118 MMHG | HEART RATE: 94 BPM | WEIGHT: 234 LBS | BODY MASS INDEX: 44.18 KG/M2 | DIASTOLIC BLOOD PRESSURE: 68 MMHG | HEIGHT: 61 IN | OXYGEN SATURATION: 95 %

## 2024-04-26 DIAGNOSIS — I10 ESSENTIAL (PRIMARY) HYPERTENSION: ICD-10-CM

## 2024-04-26 DIAGNOSIS — Z86.718 PERSONAL HISTORY OF OTHER VENOUS THROMBOSIS AND EMBOLISM: ICD-10-CM

## 2024-04-26 DIAGNOSIS — I83.893 VARICOSE VEINS OF BILATERAL LOWER EXTREMITIES WITH OTHER COMPLICATIONS: ICD-10-CM

## 2024-04-26 PROCEDURE — 99214 OFFICE O/P EST MOD 30 MIN: CPT | Mod: GC

## 2024-04-26 PROCEDURE — G0463: CPT

## 2024-04-28 PROBLEM — Z86.718 HISTORY OF RECURRENT DEEP VEIN THROMBOSIS (DVT): Status: ACTIVE | Noted: 2024-04-26

## 2024-04-28 PROBLEM — I83.893 VARICOSE VEINS OF BILATERAL LOWER EXTREMITIES WITH OTHER COMPLICATIONS: Status: ACTIVE | Noted: 2020-11-16

## 2024-04-28 NOTE — PHYSICAL EXAM
[No Acute Distress] : no acute distress [Well-Appearing] : well-appearing [No Respiratory Distress] : no respiratory distress  [Clear to Auscultation] : lungs were clear to auscultation bilaterally [Normal Rate] : normal rate  [Regular Rhythm] : with a regular rhythm [Normal S1, S2] : normal S1 and S2 [No Edema] : there was no peripheral edema [de-identified] : varicose veins of b/l LE

## 2024-04-28 NOTE — HISTORY OF PRESENT ILLNESS
[FreeTextEntry8] : 58 F PMHx of NIDDM (A1c 6.6), GERD, and pericardial effusion/tamponade of unknown etiology s/p IR-guided drainage (Dec 2013), unprovoked LLE DVT (7/2019) (on Eliquis) who is here requesting order for doppler US of LLE. Patient has h/o of recurrent LLE DVT. She had unprovoked LLE DVT in July 2019. US doppler LLE 7/27/2019 showed acute above-the-knee deep venous thrombosis extending from the external iliac vein into the common femoral vein. She was started on Eliquis in 2019 and stopped it in October 2020 per hematology's recommendation, after negative hypercoagulable work-up and interval LLE doppler showed resolution of thrombus. In September 2022, she had another unprovoked LLE DVT and restarted Eliquis. LLE US doppler 9/18/2022 showed acute deep venous thrombosis above the knee involving the LEFT common. She would like to stop Eliquis and was given referral for hematology at last visit for further evaluation. Heme requesting LE dopplers prior to visit. No h/o clotting disorders in family. No h/o miscarriages  Reports b/l leg heaviness and pain at site of varicosities. Wears compression socks

## 2024-04-28 NOTE — REVIEW OF SYSTEMS
[Fever] : no fever [Fatigue] : no fatigue [Chest Pain] : no chest pain [Leg Claudication] : no leg claudication [Lower Ext Edema] : no lower extremity edema [Orthopnea] : no orthopnea [Shortness Of Breath] : no shortness of breath [Joint Pain] : no joint pain

## 2024-05-01 ENCOUNTER — APPOINTMENT (OUTPATIENT)
Dept: OPHTHALMOLOGY | Facility: CLINIC | Age: 59
End: 2024-05-01
Payer: COMMERCIAL

## 2024-05-01 ENCOUNTER — NON-APPOINTMENT (OUTPATIENT)
Age: 59
End: 2024-05-01

## 2024-05-01 PROCEDURE — 92133 CPTRZD OPH DX IMG PST SGM ON: CPT

## 2024-05-01 PROCEDURE — 92083 EXTENDED VISUAL FIELD XM: CPT

## 2024-05-01 PROCEDURE — 92014 COMPRE OPH EXAM EST PT 1/>: CPT

## 2024-05-17 ENCOUNTER — RX RENEWAL (OUTPATIENT)
Age: 59
End: 2024-05-17

## 2024-05-17 RX ORDER — LISINOPRIL 5 MG/1
5 TABLET ORAL
Qty: 30 | Refills: 2 | Status: ACTIVE | COMMUNITY
Start: 2020-08-21 | End: 1900-01-01

## 2024-05-21 ENCOUNTER — OUTPATIENT (OUTPATIENT)
Dept: OUTPATIENT SERVICES | Facility: HOSPITAL | Age: 59
LOS: 1 days | End: 2024-05-21
Payer: SELF-PAY

## 2024-05-21 ENCOUNTER — APPOINTMENT (OUTPATIENT)
Dept: ULTRASOUND IMAGING | Facility: HOSPITAL | Age: 59
End: 2024-05-21
Payer: SELF-PAY

## 2024-05-21 DIAGNOSIS — I10 ESSENTIAL (PRIMARY) HYPERTENSION: ICD-10-CM

## 2024-05-21 DIAGNOSIS — Z86.718 PERSONAL HISTORY OF OTHER VENOUS THROMBOSIS AND EMBOLISM: ICD-10-CM

## 2024-05-21 PROCEDURE — 93970 EXTREMITY STUDY: CPT | Mod: 26

## 2024-05-21 PROCEDURE — 93970 EXTREMITY STUDY: CPT

## 2024-08-19 ENCOUNTER — APPOINTMENT (OUTPATIENT)
Dept: VASCULAR SURGERY | Facility: HOSPITAL | Age: 59
End: 2024-08-19
Payer: COMMERCIAL

## 2024-08-19 ENCOUNTER — OUTPATIENT (OUTPATIENT)
Dept: OUTPATIENT SERVICES | Facility: HOSPITAL | Age: 59
LOS: 1 days | End: 2024-08-19
Payer: SELF-PAY

## 2024-08-19 VITALS
RESPIRATION RATE: 16 BRPM | BODY MASS INDEX: 44.18 KG/M2 | SYSTOLIC BLOOD PRESSURE: 122 MMHG | TEMPERATURE: 97 F | HEIGHT: 61 IN | WEIGHT: 234 LBS | HEART RATE: 67 BPM | DIASTOLIC BLOOD PRESSURE: 85 MMHG

## 2024-08-19 DIAGNOSIS — L98.9 DISORDER OF THE SKIN AND SUBCUTANEOUS TISSUE, UNSPECIFIED: ICD-10-CM

## 2024-08-19 PROCEDURE — 99203 OFFICE O/P NEW LOW 30 MIN: CPT

## 2024-08-19 PROCEDURE — G0463: CPT

## 2024-08-21 ENCOUNTER — APPOINTMENT (OUTPATIENT)
Dept: INTERNAL MEDICINE | Facility: CLINIC | Age: 59
End: 2024-08-21
Payer: COMMERCIAL

## 2024-08-21 VITALS
BODY MASS INDEX: 43.99 KG/M2 | OXYGEN SATURATION: 95 % | HEART RATE: 74 BPM | SYSTOLIC BLOOD PRESSURE: 130 MMHG | DIASTOLIC BLOOD PRESSURE: 78 MMHG | WEIGHT: 233 LBS | HEIGHT: 61 IN

## 2024-08-21 DIAGNOSIS — I87.2 VENOUS INSUFFICIENCY (CHRONIC) (PERIPHERAL): ICD-10-CM

## 2024-08-21 DIAGNOSIS — M54.32 SCIATICA, LEFT SIDE: ICD-10-CM

## 2024-08-21 DIAGNOSIS — I82.409 ACUTE EMBOLISM AND THROMBOSIS OF UNSPECIFIED DEEP VEINS OF UNSPECIFIED LOWER EXTREMITY: ICD-10-CM

## 2024-08-21 DIAGNOSIS — Z00.00 ENCOUNTER FOR GENERAL ADULT MEDICAL EXAMINATION W/OUT ABNORMAL FINDINGS: ICD-10-CM

## 2024-08-21 PROCEDURE — 99213 OFFICE O/P EST LOW 20 MIN: CPT | Mod: GE

## 2024-08-22 LAB
BASOPHILS # BLD AUTO: 0.04 K/UL
BASOPHILS NFR BLD AUTO: 0.5 %
EOSINOPHIL # BLD AUTO: 0.22 K/UL
EOSINOPHIL NFR BLD AUTO: 2.6 %
HCT VFR BLD CALC: 39.1 %
HGB BLD-MCNC: 11.8 G/DL
IMM GRANULOCYTES NFR BLD AUTO: 0.2 %
LYMPHOCYTES # BLD AUTO: 2.62 K/UL
LYMPHOCYTES NFR BLD AUTO: 31.4 %
MAN DIFF?: NORMAL
MCHC RBC-ENTMCNC: 27.5 PG
MCHC RBC-ENTMCNC: 30.2 GM/DL
MCV RBC AUTO: 91.1 FL
MONOCYTES # BLD AUTO: 0.56 K/UL
MONOCYTES NFR BLD AUTO: 6.7 %
NEUTROPHILS # BLD AUTO: 4.89 K/UL
NEUTROPHILS NFR BLD AUTO: 58.6 %
PLATELET # BLD AUTO: 297 K/UL
RBC # BLD: 4.29 M/UL
RBC # FLD: 14.4 %
WBC # FLD AUTO: 8.35 K/UL

## 2024-08-22 NOTE — ASSESSMENT
[FreeTextEntry1] : 58-year-old female with lower extremity edema and spider veins. Associated heaviness and cramping. CEAP 3 venous insufficiency. Occasionally wears compression stockings.  Also has hx of unprovoked LLE DVT x2. On Eliquis.  Plan: - Continue compression therapy; encouraged compliance; provided prescription for 20-30mmHg knee high compression stockings - Elevate extremities at night - AC management per heme

## 2024-08-22 NOTE — PHYSICAL EXAM
[2+] : left 2+ [de-identified] : NAD [de-identified] : Nonlabored breathing [de-identified] : Regular rate [FreeTextEntry1] : Bilateral lower extremity edema (left > right) No skin changes or wounds Spider veins in bilateral legs

## 2024-08-22 NOTE — PHYSICAL EXAM
[2+] : left 2+ [de-identified] : NAD [de-identified] : Nonlabored breathing [de-identified] : Regular rate [FreeTextEntry1] : Bilateral lower extremity edema (left > right) No skin changes or wounds Spider veins in bilateral legs

## 2024-08-22 NOTE — HISTORY OF PRESENT ILLNESS
[FreeTextEntry1] : Patient is a 58-year-old presenting for evaluation of lower extremity spider veins and edema. Patient has had long standing history of spider veins in bilateral lower extremities as well as lower extremity edema (left > right). Endorses heaviness and cramping in her bilateral legs especially with prolonged standing. Denies claudication, rest pain, and wounds. Wears compression stockings occasionally but does not wear them regularly. Does not elevate legs.  Also has hx of unprovoked LLE DVT x2. On Eliquis.

## 2024-08-23 LAB
ALBUMIN SERPL ELPH-MCNC: 4.4 G/DL
ALP BLD-CCNC: 121 U/L
ALT SERPL-CCNC: 16 U/L
ANION GAP SERPL CALC-SCNC: 10 MMOL/L
AST SERPL-CCNC: 20 U/L
BILIRUB SERPL-MCNC: 0.2 MG/DL
BUN SERPL-MCNC: 15 MG/DL
CALCIUM SERPL-MCNC: 9.5 MG/DL
CHLORIDE SERPL-SCNC: 102 MMOL/L
CHOLEST SERPL-MCNC: 127 MG/DL
CO2 SERPL-SCNC: 27 MMOL/L
CREAT SERPL-MCNC: 0.7 MG/DL
EGFR: 101 ML/MIN/1.73M2
ESTIMATED AVERAGE GLUCOSE: 140 MG/DL
GLUCOSE SERPL-MCNC: 93 MG/DL
HBA1C MFR BLD HPLC: 6.5 %
HDLC SERPL-MCNC: 56 MG/DL
LDLC SERPL CALC-MCNC: 56 MG/DL
NONHDLC SERPL-MCNC: 72 MG/DL
POTASSIUM SERPL-SCNC: 3.9 MMOL/L
PROT SERPL-MCNC: 7.3 G/DL
SODIUM SERPL-SCNC: 139 MMOL/L
TRIGL SERPL-MCNC: 78 MG/DL

## 2024-08-23 NOTE — HISTORY OF PRESENT ILLNESS
[FreeTextEntry1] : F/U Appt [de-identified] : 58 F PMHx of NIDDM (A1c 6.6), GERD, and pericardial effusion/tamponade of unknown etiology s/p IR-guided drainage (Dec 2013), unprovoked LLE DVT (7/2019) (on Eliquis) presents for continued left lower leg pain  #LLE Electrical Pain - Describes pain as intermittent - Startes from lower back and shoots down to her lowe rleg like electricity - Worse when getting up from sitting - No fever, loss of sensation, No urinary/fecal incontinence, no Point Tenderness, no gait abnromalities - patient taking lifelong Eliquis for history of unprovoked DVT  #DVT - History of unprovoked DVT; on Lifelong eliquis - Compliant with eliquis treatment - Evaluated by Vascular 2 days ago with low concern for DVT

## 2024-08-23 NOTE — PHYSICAL EXAM
[Normal] : normal rate, regular rhythm, normal S1 and S2 and no murmur heard [Grossly Normal Strength/Tone] : grossly normal strength/tone [de-identified] : 1+ pitting edema on Left, trace edema on Right

## 2024-08-23 NOTE — HISTORY OF PRESENT ILLNESS
[FreeTextEntry1] : F/U Appt [de-identified] : 58 F PMHx of NIDDM (A1c 6.6), GERD, and pericardial effusion/tamponade of unknown etiology s/p IR-guided drainage (Dec 2013), unprovoked LLE DVT (7/2019) (on Eliquis) presents for continued left lower leg pain  #LLE Electrical Pain - Describes pain as intermittent - Startes from lower back and shoots down to her lowe rleg like electricity - Worse when getting up from sitting - No fever, loss of sensation, No urinary/fecal incontinence, no Point Tenderness, no gait abnromalities - patient taking lifelong Eliquis for history of unprovoked DVT  #DVT - History of unprovoked DVT; on Lifelong eliquis - Compliant with eliquis treatment - Evaluated by Vascular 2 days ago with low concern for DVT

## 2024-08-23 NOTE — PHYSICAL EXAM
[Normal] : normal rate, regular rhythm, normal S1 and S2 and no murmur heard [Grossly Normal Strength/Tone] : grossly normal strength/tone [de-identified] : 1+ pitting edema on Left, trace edema on Right

## 2024-08-23 NOTE — REVIEW OF SYSTEMS
[Back Pain] : back pain [Negative] : Genitourinary [Abdominal Pain] : no abdominal pain [Nausea] : no nausea [Constipation] : no constipation [Diarrhea] : diarrhea [Vomiting] : no vomiting [Heartburn] : no heartburn [Melena] : no melena [Confusion] : no confusion [Unsteady Walking] : no ataxia

## 2024-08-23 NOTE — HISTORY OF PRESENT ILLNESS
[FreeTextEntry1] : F/U Appt [de-identified] : 58 F PMHx of NIDDM (A1c 6.6), GERD, and pericardial effusion/tamponade of unknown etiology s/p IR-guided drainage (Dec 2013), unprovoked LLE DVT (7/2019) (on Eliquis) presents for continued left lower leg pain  #LLE Electrical Pain - Describes pain as intermittent - Startes from lower back and shoots down to her lowe rleg like electricity - Worse when getting up from sitting - No fever, loss of sensation, No urinary/fecal incontinence, no Point Tenderness, no gait abnromalities - patient taking lifelong Eliquis for history of unprovoked DVT  #DVT - History of unprovoked DVT; on Lifelong eliquis - Compliant with eliquis treatment - Evaluated by Vascular 2 days ago with low concern for DVT

## 2024-08-23 NOTE — PHYSICAL EXAM
[Normal] : normal rate, regular rhythm, normal S1 and S2 and no murmur heard [Grossly Normal Strength/Tone] : grossly normal strength/tone [de-identified] : 1+ pitting edema on Left, trace edema on Right

## 2024-08-26 DIAGNOSIS — I83.893 VARICOSE VEINS OF BILATERAL LOWER EXTREMITIES WITH OTHER COMPLICATIONS: ICD-10-CM

## 2024-09-23 ENCOUNTER — NON-APPOINTMENT (OUTPATIENT)
Age: 59
End: 2024-09-23

## 2024-09-23 ENCOUNTER — APPOINTMENT (OUTPATIENT)
Dept: OPHTHALMOLOGY | Facility: CLINIC | Age: 59
End: 2024-09-23
Payer: COMMERCIAL

## 2024-09-23 PROCEDURE — 92012 INTRM OPH EXAM EST PATIENT: CPT

## 2024-10-07 ENCOUNTER — RESULT REVIEW (OUTPATIENT)
Age: 59
End: 2024-10-07

## 2024-10-07 ENCOUNTER — OUTPATIENT (OUTPATIENT)
Dept: OUTPATIENT SERVICES | Facility: HOSPITAL | Age: 59
LOS: 1 days | End: 2024-10-07
Payer: SELF-PAY

## 2024-10-07 ENCOUNTER — APPOINTMENT (OUTPATIENT)
Dept: ULTRASOUND IMAGING | Facility: IMAGING CENTER | Age: 59
End: 2024-10-07
Payer: SELF-PAY

## 2024-10-07 ENCOUNTER — RX RENEWAL (OUTPATIENT)
Age: 59
End: 2024-10-07

## 2024-10-07 ENCOUNTER — APPOINTMENT (OUTPATIENT)
Dept: MAMMOGRAPHY | Facility: IMAGING CENTER | Age: 59
End: 2024-10-07
Payer: SELF-PAY

## 2024-10-07 DIAGNOSIS — Z00.8 ENCOUNTER FOR OTHER GENERAL EXAMINATION: ICD-10-CM

## 2024-10-07 PROCEDURE — 77063 BREAST TOMOSYNTHESIS BI: CPT | Mod: 26

## 2024-10-07 PROCEDURE — 77063 BREAST TOMOSYNTHESIS BI: CPT

## 2024-10-07 PROCEDURE — 76642 ULTRASOUND BREAST LIMITED: CPT | Mod: 26,RT

## 2024-10-07 PROCEDURE — 77067 SCR MAMMO BI INCL CAD: CPT | Mod: 26

## 2024-10-07 PROCEDURE — 76641 ULTRASOUND BREAST COMPLETE: CPT

## 2024-10-07 PROCEDURE — 77067 SCR MAMMO BI INCL CAD: CPT

## 2024-10-07 PROCEDURE — 76642 ULTRASOUND BREAST LIMITED: CPT

## 2024-10-29 ENCOUNTER — MED ADMIN CHARGE (OUTPATIENT)
Age: 59
End: 2024-10-29

## 2024-10-29 ENCOUNTER — OUTPATIENT (OUTPATIENT)
Dept: OUTPATIENT SERVICES | Facility: HOSPITAL | Age: 59
LOS: 1 days | End: 2024-10-29
Payer: SELF-PAY

## 2024-10-29 ENCOUNTER — APPOINTMENT (OUTPATIENT)
Age: 59
End: 2024-10-29

## 2024-10-29 ENCOUNTER — APPOINTMENT (OUTPATIENT)
Dept: INTERNAL MEDICINE | Facility: CLINIC | Age: 59
End: 2024-10-29
Payer: COMMERCIAL

## 2024-10-29 VITALS
HEIGHT: 61 IN | SYSTOLIC BLOOD PRESSURE: 122 MMHG | WEIGHT: 225 LBS | HEART RATE: 78 BPM | BODY MASS INDEX: 42.48 KG/M2 | OXYGEN SATURATION: 95 % | DIASTOLIC BLOOD PRESSURE: 82 MMHG

## 2024-10-29 DIAGNOSIS — E78.5 HYPERLIPIDEMIA, UNSPECIFIED: ICD-10-CM

## 2024-10-29 DIAGNOSIS — I10 ESSENTIAL (PRIMARY) HYPERTENSION: ICD-10-CM

## 2024-10-29 DIAGNOSIS — R05.9 COUGH, UNSPECIFIED: ICD-10-CM

## 2024-10-29 DIAGNOSIS — E11.9 TYPE 2 DIABETES MELLITUS W/OUT COMPLICATIONS: ICD-10-CM

## 2024-10-29 DIAGNOSIS — I82.409 ACUTE EMBOLISM AND THROMBOSIS OF UNSPECIFIED DEEP VEINS OF UNSPECIFIED LOWER EXTREMITY: ICD-10-CM

## 2024-10-29 PROCEDURE — G0463: CPT

## 2024-10-29 PROCEDURE — 99396 PREV VISIT EST AGE 40-64: CPT | Mod: GC

## 2024-10-29 PROCEDURE — 99214 OFFICE O/P EST MOD 30 MIN: CPT | Mod: 25

## 2024-10-29 PROCEDURE — T1013: CPT

## 2024-10-29 RX ORDER — BENZONATATE 100 MG/1
100 CAPSULE ORAL 3 TIMES DAILY
Qty: 42 | Refills: 0 | Status: ACTIVE | COMMUNITY
Start: 2024-10-29 | End: 1900-01-01

## 2024-11-11 DIAGNOSIS — I82.409 ACUTE EMBOLISM AND THROMBOSIS OF UNSPECIFIED DEEP VEINS OF UNSPECIFIED LOWER EXTREMITY: ICD-10-CM

## 2024-11-11 DIAGNOSIS — E11.9 TYPE 2 DIABETES MELLITUS WITHOUT COMPLICATIONS: ICD-10-CM

## 2024-11-11 DIAGNOSIS — E78.5 HYPERLIPIDEMIA, UNSPECIFIED: ICD-10-CM

## 2024-11-11 DIAGNOSIS — Z00.00 ENCOUNTER FOR GENERAL ADULT MEDICAL EXAMINATION WITHOUT ABNORMAL FINDINGS: ICD-10-CM

## 2024-11-11 DIAGNOSIS — R05.9 COUGH, UNSPECIFIED: ICD-10-CM

## 2024-11-14 ENCOUNTER — APPOINTMENT (OUTPATIENT)
Age: 59
End: 2024-11-14

## 2024-11-15 NOTE — ASSESSMENT
[FreeTextEntry1] : 58 F PMHx of NIDDM (A1c 6.6), GERD, and pericardial effusion/tamponade of unknown etiology s/p IR-guided drainage (Dec 2013), unprovoked LLE DVT (7/2019) (on Eliquis) presents for LLE pain, likely 2/2 sciatica with plan for 1 weeks course of high dose tylenol.
symmetrical

## 2024-11-26 ENCOUNTER — APPOINTMENT (OUTPATIENT)
Age: 59
End: 2024-11-26
Payer: COMMERCIAL

## 2024-11-26 PROCEDURE — D0220: CPT

## 2024-11-26 PROCEDURE — D0230: CPT

## 2024-11-26 PROCEDURE — D0274: CPT

## 2024-11-26 PROCEDURE — D0120: CPT

## 2024-12-18 ENCOUNTER — APPOINTMENT (OUTPATIENT)
Age: 59
End: 2024-12-18
Payer: COMMERCIAL

## 2024-12-18 PROCEDURE — D1110 PROPHYLAXIS - ADULT: CPT

## 2025-01-08 ENCOUNTER — APPOINTMENT (OUTPATIENT)
Dept: INTERNAL MEDICINE | Facility: CLINIC | Age: 60
End: 2025-01-08

## 2025-01-14 ENCOUNTER — APPOINTMENT (OUTPATIENT)
Age: 60
End: 2025-01-14

## 2025-01-28 ENCOUNTER — APPOINTMENT (OUTPATIENT)
Dept: GASTROENTEROLOGY | Facility: HOSPITAL | Age: 60
End: 2025-01-28

## 2025-02-06 ENCOUNTER — APPOINTMENT (OUTPATIENT)
Dept: OPHTHALMOLOGY | Facility: CLINIC | Age: 60
End: 2025-02-06
Payer: COMMERCIAL

## 2025-02-06 ENCOUNTER — NON-APPOINTMENT (OUTPATIENT)
Age: 60
End: 2025-02-06

## 2025-02-06 PROCEDURE — 92083 EXTENDED VISUAL FIELD XM: CPT

## 2025-02-06 PROCEDURE — 92133 CPTRZD OPH DX IMG PST SGM ON: CPT

## 2025-02-06 PROCEDURE — 92002 INTRM OPH EXAM NEW PATIENT: CPT

## 2025-02-18 ENCOUNTER — APPOINTMENT (OUTPATIENT)
Age: 60
End: 2025-02-18

## 2025-03-17 ENCOUNTER — OUTPATIENT (OUTPATIENT)
Dept: OUTPATIENT SERVICES | Facility: HOSPITAL | Age: 60
LOS: 1 days | End: 2025-03-17
Payer: SELF-PAY

## 2025-03-17 ENCOUNTER — APPOINTMENT (OUTPATIENT)
Dept: INTERNAL MEDICINE | Facility: CLINIC | Age: 60
End: 2025-03-17
Payer: COMMERCIAL

## 2025-03-17 ENCOUNTER — RESULT REVIEW (OUTPATIENT)
Age: 60
End: 2025-03-17

## 2025-03-17 VITALS
BODY MASS INDEX: 42.48 KG/M2 | OXYGEN SATURATION: 95 % | HEIGHT: 61 IN | SYSTOLIC BLOOD PRESSURE: 118 MMHG | HEART RATE: 62 BPM | DIASTOLIC BLOOD PRESSURE: 80 MMHG | WEIGHT: 225 LBS

## 2025-03-17 DIAGNOSIS — I82.409 ACUTE EMBOLISM AND THROMBOSIS OF UNSPECIFIED DEEP VEINS OF UNSPECIFIED LOWER EXTREMITY: ICD-10-CM

## 2025-03-17 DIAGNOSIS — I83.899 VARICOSE VEINS OF UNSPECIFIED LOWER EXTREMITY WITH OTHER COMPLICATIONS: ICD-10-CM

## 2025-03-17 DIAGNOSIS — M79.605 PAIN IN LEFT LEG: ICD-10-CM

## 2025-03-17 DIAGNOSIS — I10 ESSENTIAL (PRIMARY) HYPERTENSION: ICD-10-CM

## 2025-03-17 PROCEDURE — G0463: CPT

## 2025-03-17 PROCEDURE — 99214 OFFICE O/P EST MOD 30 MIN: CPT | Mod: GC

## 2025-03-17 PROCEDURE — G2211 COMPLEX E/M VISIT ADD ON: CPT

## 2025-03-19 PROBLEM — M79.605 PAIN OF LEFT LOWER EXTREMITY: Status: ACTIVE | Noted: 2025-03-19

## 2025-03-21 ENCOUNTER — APPOINTMENT (OUTPATIENT)
Dept: ULTRASOUND IMAGING | Facility: HOSPITAL | Age: 60
End: 2025-03-21

## 2025-03-21 ENCOUNTER — OUTPATIENT (OUTPATIENT)
Dept: OUTPATIENT SERVICES | Facility: HOSPITAL | Age: 60
LOS: 1 days | End: 2025-03-21
Payer: SELF-PAY

## 2025-03-21 DIAGNOSIS — I10 ESSENTIAL (PRIMARY) HYPERTENSION: ICD-10-CM

## 2025-03-21 PROCEDURE — 93970 EXTREMITY STUDY: CPT | Mod: 26

## 2025-03-21 PROCEDURE — 93970 EXTREMITY STUDY: CPT

## 2025-03-26 ENCOUNTER — APPOINTMENT (OUTPATIENT)
Dept: ULTRASOUND IMAGING | Facility: HOSPITAL | Age: 60
End: 2025-03-26

## 2025-04-01 DIAGNOSIS — I83.899 VARICOSE VEINS OF UNSPECIFIED LOWER EXTREMITY WITH OTHER COMPLICATIONS: ICD-10-CM

## 2025-04-01 DIAGNOSIS — M79.605 PAIN IN LEFT LEG: ICD-10-CM

## 2025-04-01 DIAGNOSIS — I82.409 ACUTE EMBOLISM AND THROMBOSIS OF UNSPECIFIED DEEP VEINS OF UNSPECIFIED LOWER EXTREMITY: ICD-10-CM

## 2025-04-11 NOTE — REVIEW OF SYSTEMS
Never smoker [Vision Problems] : vision problems [Heartburn] : heartburn [Joint Pain] : joint pain [Anxiety] : anxiety [Fever] : no fever [Chills] : no chills [Fatigue] : no fatigue [Night Sweats] : no night sweats [Recent Change In Weight] : ~T no recent weight change [Discharge] : no discharge [Pain] : no pain [Redness] : no redness [Dryness] : no dryness  [Itching] : no itching [Earache] : no earache [Hearing Loss] : no hearing loss [Nosebleed] : no nosebleeds [Hoarseness] : no hoarseness [Nasal Discharge] : no nasal discharge [Sore Throat] : no sore throat [Chest Pain] : no chest pain [Palpitations] : no palpitations [Leg Claudication] : no leg claudication [Lower Ext Edema] : no lower extremity edema [Orthopnea] : no orthopnea [Paroxysmal Nocturnal Dyspnea] : no paroxysmal nocturnal dyspnea [Shortness Of Breath] : no shortness of breath [Wheezing] : no wheezing [Cough] : no cough [Dyspnea on Exertion] : no dyspnea on exertion [Abdominal Pain] : no abdominal pain [Nausea] : no nausea [Constipation] : no constipation [Diarrhea] : diarrhea [Vomiting] : no vomiting [Joint Stiffness] : no joint stiffness [Joint Swelling] : no joint swelling [Muscle Weakness] : no muscle weakness [Muscle Pain] : no muscle pain [Back Pain] : no back pain [Mole Changes] : no mole changes [Nail Changes] : no nail changes [Hair Changes] : no hair changes [Skin Rash] : no skin rash [Headache] : no headache [Dizziness] : no dizziness [Fainting] : no fainting [Confusion] : no confusion [Unsteady Walking] : no ataxia [Memory Loss] : no memory loss [Suicidal] : not suicidal [Insomnia] : no insomnia [Depression] : no depression [Easy Bleeding] : no easy bleeding [Easy Bruising] : no easy bruising [Swollen Glands] : no swollen glands

## 2025-04-22 ENCOUNTER — APPOINTMENT (OUTPATIENT)
Dept: INTERNAL MEDICINE | Facility: CLINIC | Age: 60
End: 2025-04-22

## 2025-08-14 ENCOUNTER — NON-APPOINTMENT (OUTPATIENT)
Age: 60
End: 2025-08-14

## 2025-08-14 ENCOUNTER — APPOINTMENT (OUTPATIENT)
Dept: OPHTHALMOLOGY | Facility: CLINIC | Age: 60
End: 2025-08-14
Payer: COMMERCIAL

## 2025-08-14 PROCEDURE — 92133 CPTRZD OPH DX IMG PST SGM ON: CPT

## 2025-08-14 PROCEDURE — 92014 COMPRE OPH EXAM EST PT 1/>: CPT

## 2025-08-27 ENCOUNTER — APPOINTMENT (OUTPATIENT)
Dept: OBGYN | Facility: CLINIC | Age: 60
End: 2025-08-27
Payer: COMMERCIAL

## 2025-08-27 ENCOUNTER — OUTPATIENT (OUTPATIENT)
Dept: OUTPATIENT SERVICES | Facility: HOSPITAL | Age: 60
LOS: 1 days | End: 2025-08-27
Payer: SELF-PAY

## 2025-08-27 ENCOUNTER — LABORATORY RESULT (OUTPATIENT)
Age: 60
End: 2025-08-27

## 2025-08-27 DIAGNOSIS — N84.0 POLYP OF CORPUS UTERI: ICD-10-CM

## 2025-08-27 DIAGNOSIS — N95.0 POSTMENOPAUSAL BLEEDING: ICD-10-CM

## 2025-08-27 DIAGNOSIS — N76.0 ACUTE VAGINITIS: ICD-10-CM

## 2025-08-27 PROCEDURE — 58100 BIOPSY OF UTERUS LINING: CPT

## 2025-08-27 RX ORDER — KETOROLAC TROMETHAMINE 30 MG/ML
30 VIAL (ML) INJECTION
Qty: 1 | Refills: 0 | Status: COMPLETED | OUTPATIENT
Start: 2025-08-27

## 2025-08-27 RX ADMIN — KETOROLAC TROMETHAMINE 0 MG/ML: 30 INJECTION, SOLUTION INTRAMUSCULAR; INTRAVENOUS at 00:00

## 2025-09-04 ENCOUNTER — MED ADMIN CHARGE (OUTPATIENT)
Age: 60
End: 2025-09-04

## 2025-09-04 ENCOUNTER — OUTPATIENT (OUTPATIENT)
Dept: OUTPATIENT SERVICES | Facility: HOSPITAL | Age: 60
LOS: 1 days | End: 2025-09-04
Payer: SELF-PAY

## 2025-09-04 ENCOUNTER — APPOINTMENT (OUTPATIENT)
Dept: INTERNAL MEDICINE | Facility: CLINIC | Age: 60
End: 2025-09-04
Payer: COMMERCIAL

## 2025-09-04 VITALS
OXYGEN SATURATION: 93 % | HEART RATE: 68 BPM | WEIGHT: 241 LBS | DIASTOLIC BLOOD PRESSURE: 86 MMHG | BODY MASS INDEX: 45.5 KG/M2 | HEIGHT: 61 IN | SYSTOLIC BLOOD PRESSURE: 120 MMHG

## 2025-09-04 DIAGNOSIS — E11.9 TYPE 2 DIABETES MELLITUS W/OUT COMPLICATIONS: ICD-10-CM

## 2025-09-04 DIAGNOSIS — G89.29 PAIN IN UNSPECIFIED FOOT: ICD-10-CM

## 2025-09-04 DIAGNOSIS — I82.409 ACUTE EMBOLISM AND THROMBOSIS OF UNSPECIFIED DEEP VEINS OF UNSPECIFIED LOWER EXTREMITY: ICD-10-CM

## 2025-09-04 DIAGNOSIS — M79.673 PAIN IN UNSPECIFIED FOOT: ICD-10-CM

## 2025-09-04 DIAGNOSIS — E66.9 OBESITY, UNSPECIFIED: ICD-10-CM

## 2025-09-04 DIAGNOSIS — I10 ESSENTIAL (PRIMARY) HYPERTENSION: ICD-10-CM

## 2025-09-04 PROCEDURE — G0463: CPT

## 2025-09-04 PROCEDURE — 90677 PCV20 VACCINE IM: CPT

## 2025-09-04 PROCEDURE — G0008: CPT

## 2025-09-04 PROCEDURE — G0009: CPT

## 2025-09-04 PROCEDURE — G2211 COMPLEX E/M VISIT ADD ON: CPT

## 2025-09-04 PROCEDURE — 90656 IIV3 VACC NO PRSV 0.5 ML IM: CPT

## 2025-09-04 PROCEDURE — 99213 OFFICE O/P EST LOW 20 MIN: CPT | Mod: GE

## 2025-09-04 PROCEDURE — 83036 HEMOGLOBIN GLYCOSYLATED A1C: CPT

## 2025-09-05 ENCOUNTER — RESULT REVIEW (OUTPATIENT)
Age: 60
End: 2025-09-05

## 2025-09-05 LAB — HBA1C MFR BLD HPLC: 6.2

## 2025-09-05 RX ORDER — APIXABAN 5 MG/1
5 TABLET, FILM COATED ORAL
Qty: 180 | Refills: 3 | Status: ACTIVE | COMMUNITY
Start: 2025-09-05 | End: 1900-01-01

## 2025-09-11 ENCOUNTER — APPOINTMENT (OUTPATIENT)
Dept: ULTRASOUND IMAGING | Facility: CLINIC | Age: 60
End: 2025-09-11
Payer: COMMERCIAL

## 2025-09-11 PROCEDURE — 76831 ECHO EXAM UTERUS: CPT

## 2025-09-11 PROCEDURE — 58340 CATHETER FOR HYSTEROGRAPHY: CPT

## 2025-09-16 ENCOUNTER — NON-APPOINTMENT (OUTPATIENT)
Age: 60
End: 2025-09-16

## 2025-09-17 DIAGNOSIS — E66.9 OBESITY, UNSPECIFIED: ICD-10-CM

## 2025-09-17 DIAGNOSIS — M79.673 PAIN IN UNSPECIFIED FOOT: ICD-10-CM

## 2025-09-17 DIAGNOSIS — Z23 ENCOUNTER FOR IMMUNIZATION: ICD-10-CM

## 2025-09-17 DIAGNOSIS — I82.409 ACUTE EMBOLISM AND THROMBOSIS OF UNSPECIFIED DEEP VEINS OF UNSPECIFIED LOWER EXTREMITY: ICD-10-CM

## 2025-09-17 DIAGNOSIS — G89.29 OTHER CHRONIC PAIN: ICD-10-CM

## 2025-09-17 RX ORDER — APIXABAN 5 MG/1
5 TABLET, FILM COATED ORAL
Qty: 90 | Refills: 3 | Status: ACTIVE | OUTPATIENT
Start: 2025-09-17

## (undated) DEVICE — VISITEC 4X4

## (undated) DEVICE — GLV 8 PROTEXIS (WHITE)

## (undated) DEVICE — WARMING BLANKET UPPER ADULT

## (undated) DEVICE — MEDICATION LABELS W MARKER

## (undated) DEVICE — DRAPE LAPAROSCOPIC FLUID CONTROL PACK

## (undated) DEVICE — SYR ASEPTO

## (undated) DEVICE — DRAPE TOWEL BLUE 17" X 24"

## (undated) DEVICE — LAP PAD 18 X 18"

## (undated) DEVICE — GOWN TRIMAX LG

## (undated) DEVICE — TUBING SUCTION 20FT

## (undated) DEVICE — ELCTR CUTTING LOOP 24FR RIGHT ANGLE

## (undated) DEVICE — DRSG TELFA 3 X 8

## (undated) DEVICE — ELCTR CUTTING LOOP 24FR 12 DEG 0.35 WIRE

## (undated) DEVICE — FOLEY TRAY 16FR LF URINE METER SURESTEP

## (undated) DEVICE — DRAPE LIGHT HANDLE COVER (BLUE)

## (undated) DEVICE — PREP BETADINE KIT

## (undated) DEVICE — GLV 7.5 PROTEXIS (WHITE)

## (undated) DEVICE — POSITIONER FOAM EGG CRATE ULNAR 2PCS (PINK)

## (undated) DEVICE — DRAPE MAYO STAND 30"

## (undated) DEVICE — SPECIMEN CONTAINER 100ML

## (undated) DEVICE — GLV 8.5 PROTEXIS (WHITE)

## (undated) DEVICE — SOL IRR GLYCINE 1.5% 3000L

## (undated) DEVICE — TUBING STRYKER HYSTEROSCOPY INFLOW OUTFLOW

## (undated) DEVICE — SOL IRR POUR NS 0.9% 500ML

## (undated) DEVICE — GLV 7 PROTEXIS (WHITE)

## (undated) DEVICE — VENODYNE/SCD SLEEVE CALF LARGE

## (undated) DEVICE — SOL IRR POUR H2O 250ML

## (undated) DEVICE — PACK BASIC GOWN

## (undated) DEVICE — GLV 6.5 PROTEXIS (WHITE)

## (undated) DEVICE — FOLEY TRAY 16FR 5CC LTX UMETER CLOSED

## (undated) DEVICE — PREP BETADINE SPONGE STICKS